# Patient Record
Sex: FEMALE | Race: WHITE | Employment: PART TIME | URBAN - METROPOLITAN AREA
[De-identification: names, ages, dates, MRNs, and addresses within clinical notes are randomized per-mention and may not be internally consistent; named-entity substitution may affect disease eponyms.]

---

## 2017-05-12 ENCOUNTER — OFFICE VISIT (OUTPATIENT)
Dept: GASTROENTEROLOGY | Age: 27
End: 2017-05-12
Payer: MEDICARE

## 2017-05-12 VITALS
BODY MASS INDEX: 28.1 KG/M2 | HEIGHT: 63 IN | HEART RATE: 110 BPM | OXYGEN SATURATION: 98 % | WEIGHT: 158.6 LBS | DIASTOLIC BLOOD PRESSURE: 87 MMHG | RESPIRATION RATE: 13 BRPM | TEMPERATURE: 97.1 F | SYSTOLIC BLOOD PRESSURE: 132 MMHG

## 2017-05-12 DIAGNOSIS — K80.20 GALL STONES: Primary | ICD-10-CM

## 2017-05-12 DIAGNOSIS — K62.5 RECTAL BLEEDING: ICD-10-CM

## 2017-05-12 DIAGNOSIS — K58.2 IRRITABLE BOWEL SYNDROME WITH BOTH CONSTIPATION AND DIARRHEA: ICD-10-CM

## 2017-05-12 DIAGNOSIS — K58.1 IRRITABLE BOWEL SYNDROME WITH CONSTIPATION: ICD-10-CM

## 2017-05-12 DIAGNOSIS — F41.9 ANXIETY: ICD-10-CM

## 2017-05-12 PROCEDURE — 99214 OFFICE O/P EST MOD 30 MIN: CPT | Performed by: INTERNAL MEDICINE

## 2017-05-12 ASSESSMENT — ENCOUNTER SYMPTOMS
ABDOMINAL DISTENTION: 1
VOMITING: 0
BLOOD IN STOOL: 0
COUGH: 0
DIARRHEA: 0
SINUS PRESSURE: 1
ANAL BLEEDING: 0
RECTAL PAIN: 0
NAUSEA: 0
CONSTIPATION: 0
WHEEZING: 1
ABDOMINAL PAIN: 0
EYES NEGATIVE: 1

## 2018-01-17 ENCOUNTER — OFFICE VISIT (OUTPATIENT)
Dept: GASTROENTEROLOGY | Age: 28
End: 2018-01-17
Payer: COMMERCIAL

## 2018-01-17 VITALS
HEIGHT: 63 IN | HEART RATE: 82 BPM | RESPIRATION RATE: 12 BRPM | WEIGHT: 148.6 LBS | DIASTOLIC BLOOD PRESSURE: 73 MMHG | SYSTOLIC BLOOD PRESSURE: 111 MMHG | BODY MASS INDEX: 26.33 KG/M2

## 2018-01-17 DIAGNOSIS — K58.2 IRRITABLE BOWEL SYNDROME WITH BOTH CONSTIPATION AND DIARRHEA: Primary | ICD-10-CM

## 2018-01-17 DIAGNOSIS — K62.5 RECTAL BLEEDING: ICD-10-CM

## 2018-01-17 DIAGNOSIS — R63.4 WEIGHT LOSS: ICD-10-CM

## 2018-01-17 DIAGNOSIS — F43.9 STRESS: ICD-10-CM

## 2018-01-17 PROCEDURE — 99214 OFFICE O/P EST MOD 30 MIN: CPT | Performed by: INTERNAL MEDICINE

## 2018-01-17 ASSESSMENT — ENCOUNTER SYMPTOMS
COUGH: 0
VOMITING: 0
NAUSEA: 0
ABDOMINAL DISTENTION: 0
BLOOD IN STOOL: 0
ABDOMINAL PAIN: 0
RECTAL PAIN: 0
SINUS PRESSURE: 1
EYES NEGATIVE: 1
DIARRHEA: 1
CONSTIPATION: 1
ANAL BLEEDING: 1
RESPIRATORY NEGATIVE: 1
WHEEZING: 0

## 2018-01-17 NOTE — PROGRESS NOTES
Subjective:      Patient ID: Lizz Parham is a 32 y.o. female. HPI   Dr. Alex Larry MD our mutual patient Lizz Parham was seen  for   1. Irritable bowel syndrome with both constipation and diarrhea    2. Rectal bleeding    3. Weight loss    4. Stress     . Here for f/u   Has been eating rel healthy and is feeling better  Has lost about 10 lbs  No bleeding  No melena  Has some stress issues    Past Medical, Family, and Social History reviewed and does contribute to the patient presenting condition. patient\"s PMH/PSH,SH,PSYCH hx, MEDs, ALLERGIES, and ROS was all reviewed and updated ion the appropriate sections      Review of Systems   Constitutional: Positive for fatigue. HENT: Positive for postnasal drip and sinus pressure. Eyes: Negative. Respiratory: Negative. Negative for cough and wheezing. Cardiovascular: Negative. Gastrointestinal: Positive for anal bleeding, constipation and diarrhea. Negative for abdominal distention, abdominal pain, blood in stool, nausea, rectal pain and vomiting. Endocrine: Negative. Genitourinary: Negative. Musculoskeletal: Negative. Skin: Negative. Allergic/Immunologic: Positive for environmental allergies. Neurological: Positive for weakness. Negative for dizziness and light-headedness. Hematological: Negative. Psychiatric/Behavioral: Positive for sleep disturbance. The patient is nervous/anxious. Reviewed and agree  Objective:   Physical Exam   Constitutional: She is oriented to person, place, and time. She appears well-developed and well-nourished. HENT:   Head: Normocephalic and atraumatic. Mouth/Throat: Oropharynx is clear and moist.   Eyes: Conjunctivae are normal. Pupils are equal, round, and reactive to light. Neck: Normal range of motion. Neck supple. Cardiovascular: Normal heart sounds and intact distal pulses. Pulmonary/Chest: Effort normal and breath sounds normal.   Abdominal: Soft.  Bowel sounds are

## 2018-05-01 ENCOUNTER — OFFICE VISIT (OUTPATIENT)
Dept: FAMILY MEDICINE CLINIC | Age: 28
End: 2018-05-01
Payer: COMMERCIAL

## 2018-05-01 VITALS
BODY MASS INDEX: 27.42 KG/M2 | WEIGHT: 149 LBS | SYSTOLIC BLOOD PRESSURE: 120 MMHG | HEART RATE: 106 BPM | RESPIRATION RATE: 20 BRPM | HEIGHT: 62 IN | DIASTOLIC BLOOD PRESSURE: 88 MMHG | OXYGEN SATURATION: 97 % | TEMPERATURE: 99.5 F

## 2018-05-01 DIAGNOSIS — R19.7 DIARRHEA, UNSPECIFIED TYPE: ICD-10-CM

## 2018-05-01 DIAGNOSIS — J02.9 SORE THROAT: ICD-10-CM

## 2018-05-01 DIAGNOSIS — J02.9 ACUTE PHARYNGITIS, UNSPECIFIED ETIOLOGY: Primary | ICD-10-CM

## 2018-05-01 DIAGNOSIS — Z20.818 STREP THROAT EXPOSURE: ICD-10-CM

## 2018-05-01 LAB — S PYO AG THROAT QL: NORMAL

## 2018-05-01 PROCEDURE — 87880 STREP A ASSAY W/OPTIC: CPT | Performed by: NURSE PRACTITIONER

## 2018-05-01 PROCEDURE — 99201 PR OFFICE OUTPATIENT NEW 10 MINUTES: CPT | Performed by: NURSE PRACTITIONER

## 2018-05-01 RX ORDER — AMOXICILLIN 500 MG/1
500 CAPSULE ORAL 3 TIMES DAILY
Qty: 30 CAPSULE | Refills: 0 | Status: SHIPPED | OUTPATIENT
Start: 2018-05-01 | End: 2018-05-11 | Stop reason: ALTCHOICE

## 2018-05-01 RX ORDER — NORETHINDRONE ACETATE AND ETHINYL ESTRADIOL 1MG-20(21)
KIT ORAL
Refills: 10 | COMMUNITY
Start: 2018-04-27 | End: 2018-05-11 | Stop reason: ALTCHOICE

## 2018-05-01 ASSESSMENT — PATIENT HEALTH QUESTIONNAIRE - PHQ9
1. LITTLE INTEREST OR PLEASURE IN DOING THINGS: 0
SUM OF ALL RESPONSES TO PHQ9 QUESTIONS 1 & 2: 0
2. FEELING DOWN, DEPRESSED OR HOPELESS: 0
SUM OF ALL RESPONSES TO PHQ QUESTIONS 1-9: 0

## 2018-05-01 ASSESSMENT — ENCOUNTER SYMPTOMS
RHINORRHEA: 0
SHORTNESS OF BREATH: 0
DIARRHEA: 1
WHEEZING: 0
ABDOMINAL PAIN: 0
VOMITING: 0
SORE THROAT: 1
COUGH: 1

## 2018-05-11 ENCOUNTER — OFFICE VISIT (OUTPATIENT)
Dept: FAMILY MEDICINE CLINIC | Age: 28
End: 2018-05-11
Payer: COMMERCIAL

## 2018-05-11 VITALS
BODY MASS INDEX: 27.38 KG/M2 | OXYGEN SATURATION: 98 % | RESPIRATION RATE: 18 BRPM | DIASTOLIC BLOOD PRESSURE: 68 MMHG | HEIGHT: 62 IN | TEMPERATURE: 99.3 F | WEIGHT: 148.81 LBS | SYSTOLIC BLOOD PRESSURE: 120 MMHG | HEART RATE: 78 BPM

## 2018-05-11 DIAGNOSIS — J20.9 ACUTE BRONCHITIS, UNSPECIFIED ORGANISM: Primary | ICD-10-CM

## 2018-05-11 PROCEDURE — 99213 OFFICE O/P EST LOW 20 MIN: CPT | Performed by: NURSE PRACTITIONER

## 2018-05-11 RX ORDER — ALBUTEROL SULFATE 90 UG/1
2 AEROSOL, METERED RESPIRATORY (INHALATION) EVERY 4 HOURS PRN
Qty: 1 INHALER | Refills: 0 | Status: SHIPPED | OUTPATIENT
Start: 2018-05-11 | End: 2018-06-13

## 2018-05-11 RX ORDER — PREDNISONE 20 MG/1
40 TABLET ORAL DAILY
Qty: 8 TABLET | Refills: 0 | Status: SHIPPED | OUTPATIENT
Start: 2018-05-11 | End: 2018-05-15

## 2018-05-11 RX ORDER — BENZONATATE 100 MG/1
100 CAPSULE ORAL 3 TIMES DAILY PRN
Qty: 20 CAPSULE | Refills: 0 | Status: SHIPPED | OUTPATIENT
Start: 2018-05-11 | End: 2018-06-13

## 2018-05-11 ASSESSMENT — ENCOUNTER SYMPTOMS
VOMITING: 0
COUGH: 1
DIARRHEA: 0

## 2018-06-13 ENCOUNTER — OFFICE VISIT (OUTPATIENT)
Dept: GASTROENTEROLOGY | Age: 28
End: 2018-06-13
Payer: COMMERCIAL

## 2018-06-13 VITALS
WEIGHT: 149.4 LBS | BODY MASS INDEX: 27.32 KG/M2 | SYSTOLIC BLOOD PRESSURE: 116 MMHG | DIASTOLIC BLOOD PRESSURE: 74 MMHG | HEART RATE: 105 BPM

## 2018-06-13 DIAGNOSIS — K58.2 IRRITABLE BOWEL SYNDROME WITH BOTH CONSTIPATION AND DIARRHEA: ICD-10-CM

## 2018-06-13 DIAGNOSIS — R14.0 ABDOMINAL BLOATING: ICD-10-CM

## 2018-06-13 DIAGNOSIS — R10.84 GENERALIZED ABDOMINAL PAIN: ICD-10-CM

## 2018-06-13 DIAGNOSIS — K62.89 RECTAL PAIN: ICD-10-CM

## 2018-06-13 DIAGNOSIS — K62.5 RECTAL BLEEDING: Primary | ICD-10-CM

## 2018-06-13 DIAGNOSIS — R11.0 NAUSEA: ICD-10-CM

## 2018-06-13 PROBLEM — R10.9 ABDOMINAL PAIN: Status: ACTIVE | Noted: 2018-06-13

## 2018-06-13 PROCEDURE — 99214 OFFICE O/P EST MOD 30 MIN: CPT | Performed by: INTERNAL MEDICINE

## 2018-06-13 RX ORDER — POLYETHYLENE GLYCOL 3350 17 G/17G
POWDER, FOR SOLUTION ORAL
Qty: 255 G | Refills: 0 | Status: SHIPPED | OUTPATIENT
Start: 2018-06-13 | End: 2018-07-13

## 2018-06-13 RX ORDER — ACETAMINOPHEN 500 MG
500 TABLET ORAL EVERY 4 HOURS PRN
COMMUNITY
End: 2021-06-17

## 2018-06-13 RX ORDER — FAMOTIDINE 40 MG/1
40 TABLET, FILM COATED ORAL NIGHTLY
Refills: 0 | COMMUNITY
Start: 2018-05-23 | End: 2019-06-27

## 2018-06-13 ASSESSMENT — ENCOUNTER SYMPTOMS
CONSTIPATION: 1
DIARRHEA: 1
NAUSEA: 0
COUGH: 0
ABDOMINAL PAIN: 1
RESPIRATORY NEGATIVE: 1
EYES NEGATIVE: 1
ANAL BLEEDING: 1
VOMITING: 1
BLOOD IN STOOL: 0
WHEEZING: 0
RECTAL PAIN: 1
ABDOMINAL DISTENTION: 1
SINUS PRESSURE: 1

## 2018-07-08 ENCOUNTER — HOSPITAL ENCOUNTER (EMERGENCY)
Age: 28
Discharge: HOME | End: 2018-07-08
Payer: COMMERCIAL

## 2018-07-08 ENCOUNTER — APPOINTMENT (OUTPATIENT)
Dept: GENERAL RADIOLOGY | Age: 28
End: 2018-07-08
Payer: COMMERCIAL

## 2018-07-08 VITALS
RESPIRATION RATE: 16 BRPM | TEMPERATURE: 98.1 F | HEIGHT: 62 IN | HEART RATE: 89 BPM | SYSTOLIC BLOOD PRESSURE: 138 MMHG | WEIGHT: 140 LBS | DIASTOLIC BLOOD PRESSURE: 90 MMHG | OXYGEN SATURATION: 99 % | BODY MASS INDEX: 25.76 KG/M2

## 2018-07-08 DIAGNOSIS — M84.374A STRESS FRACTURE OF RIGHT FOOT, INITIAL ENCOUNTER: Primary | ICD-10-CM

## 2018-07-08 PROCEDURE — 99283 EMERGENCY DEPT VISIT LOW MDM: CPT

## 2018-07-08 PROCEDURE — 73630 X-RAY EXAM OF FOOT: CPT

## 2018-07-08 RX ORDER — IBUPROFEN 600 MG/1
600 TABLET ORAL EVERY 6 HOURS PRN
Qty: 30 TABLET | Refills: 0 | Status: SHIPPED | OUTPATIENT
Start: 2018-07-08 | End: 2020-08-25

## 2018-07-08 ASSESSMENT — PAIN DESCRIPTION - DESCRIPTORS: DESCRIPTORS: ACHING;SHARP;STABBING

## 2018-07-08 ASSESSMENT — PAIN DESCRIPTION - PAIN TYPE: TYPE: ACUTE PAIN

## 2018-07-08 ASSESSMENT — PAIN SCALES - GENERAL: PAINLEVEL_OUTOF10: 6

## 2018-07-08 ASSESSMENT — PAIN DESCRIPTION - ORIENTATION: ORIENTATION: RIGHT

## 2018-07-08 ASSESSMENT — PAIN DESCRIPTION - LOCATION: LOCATION: FOOT

## 2018-07-08 NOTE — ED PROVIDER NOTES
[address] [place] [address] ED  eMERGENCY dEPARTMENT eNCOUnter      Pt Name: Elena Perez  MRN: [id number]  [place] 1990  Date of evaluation: 7/8/2018  Provider: Adrienne Desir MD    [address] [place] [address]       Chief Complaint   Patient presents with    Foot Pain     rt foot pain past 10 days(denies injury)         HISTORY OF PRESENT ILLNESS  (Location/Symptom, Timing/Onset, Context/Setting, Quality, Duration, Modifying Factors, Severity.)   Elena Perez is a 32 y.o. female who presents to the emergency department Presents with foot pain a 6 on a scale of 1-10. Does a exacerbated by ambulation has been ongoing for the past 10 days. No history of fall or trauma. 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 Nursing Notes were reviewed. ALLERGIES     Benzodiazepines    CURRENT MEDICATIONS       Previous Medications    ACETAMINOPHEN (TYLENOL) 500 MG TABLET    Take 500 mg by mouth 2 times daily    CHOLECALCIFEROL (VITAMIN D3) 53724 UNITS CAPS    Take 1,000 Units by mouth 2 times daily    DOCUSATE SODIUM (COLACE) 100 MG CAPSULE    Take 100 mg by mouth 2 times daily. FAMOTIDINE (PEPCID) 40 MG TABLET        FLUDROCORTISONE (FLORINEF) 0.1 MG TABLET    Take 0.1 mg by mouth daily    NORETHIN ACE-ETH ESTRAD-FE (BLISOVI FE 1/20 PO)    Take by mouth daily    POLYETHYLENE GLYCOL (GLYCOLAX) POWDER    Please dispense with 4 dulcolax tabs with this prescription. Use as directed by following your patient instructions given by office. PSYLLIUM (METAMUCIL) 58.6 % PACKET    Take 1 packet by mouth daily.     SERTRALINE (ZOLOFT) 50 MG TABLET           PAST MEDICAL HISTORY         Diagnosis Date    Anemia during pregnancy     Irritable bowel syndrome     Kidney stone 9/29/2014    left side 0.6cm sized stone    Neurocardiogenic syncope        SURGICAL HISTORY           Procedure Laterality Date    COLONOSCOPY      GYNECOLOGIC CRYOSURGERY behavior is normal.   Nursing note and vitals reviewed. DIAGNOSTIC RESULTS     EKG: All EKG's are interpreted by the Emergency Department Physician who either signs or Co-signs this chart in the absence of a cardiologist.        RADIOLOGY:   Non-plain film images such as CT, Ultrasound and MRI are read by the radiologist. [facility] radiographic images are visualized and preliminarily interpreted by the emergency physician with the below findings:      Xr Foot Right (min 3 Views)    Result Date: 7/8/2018  EXAMINATION: 3 XRAY VIEWS OF THE RIGHT FOOT 7/8/2018 11:38 am COMPARISON: None. HISTORY: ORDERING SYSTEM PROVIDED HISTORY: Pain TECHNOLOGIST PROVIDED HISTORY: Reason for exam:->Pain Acuity: Acute Type of Exam: Unknown FINDINGS: Frontal, lateral, and oblique views of the foot are submitted for review. There is no evidence for acute fracture or dislocation. Bony mineralization is normal for age. Overlying soft tissues are unremarkable. No radiopaque foreign body identified. No acute osseus abnormality of the foot. Interpretation per the Radiologist below, if available at the time of this note:    XR FOOT RIGHT (MIN 3 VIEWS)   Final Result   No acute osseus abnormality of the foot. ED BEDSIDE ULTRASOUND:   Performed by ED Physician - none    LABS:  Labs Reviewed - No data to display    All other labs were within normal range or not returned as of this dictation. EMERGENCY DEPARTMENT COURSE and DIFFERENTIAL DIAGNOSIS/MDM:   Vitals:    Vitals:    07/08/18 1103   BP: (!) 138/90   Pulse: 89   Resp: 16   Temp: 98.1 °F (36.7 °C)   TempSrc: Oral   SpO2: 99%   Weight: 140 lb (63.5 kg)   Height: 5' 2\" (1.575 m)     Patient said discharged [employer] shoe crutches and advised follow-up care    CONSULTS:  None    PROCEDURES:  Not clinically indicated. FINAL IMPRESSION      1.  Stress fracture of right foot, initial encounter          DISPOSITION/PLAN   DISPOSITION Decision To Discharge 07/08/2018 12:39:45 PM      PATIENT REFERRED TO:   Anibal Sandoval, [address] [facility] [address] [phone]            DISCHARGE MEDICATIONS:     New Prescriptions    IBUPROFEN (ADVIL;MOTRIN) 600 MG TABLET    Take 1 tablet by mouth every 6 hours as needed for Pain           (Please note that portions of this note were completed with a voice recognition program.  Efforts were made to edit the dictations but occasionally words are mis-transcribed.)    Marialuisa Steen MD  Attending Emergency Physician           Marialuisa Steen MD  07/08/18 3679

## 2018-08-28 ENCOUNTER — ANESTHESIA EVENT (OUTPATIENT)
Dept: OPERATING ROOM | Age: 28
End: 2018-08-28
Payer: COMMERCIAL

## 2018-08-28 ENCOUNTER — TELEPHONE (OUTPATIENT)
Dept: GASTROENTEROLOGY | Age: 28
End: 2018-08-28

## 2018-08-28 NOTE — TELEPHONE ENCOUNTER
Writer called pt to confirm procedure for [email or web address] @4674am now. Pt agrees to new time and understands directions.

## 2018-08-29 ENCOUNTER — HOSPITAL ENCOUNTER (OUTPATIENT)
Age: 28
Setting detail: OUTPATIENT SURGERY
Discharge: HOME | End: 2018-08-29
Attending: INTERNAL MEDICINE | Admitting: INTERNAL MEDICINE
Payer: COMMERCIAL

## 2018-08-29 ENCOUNTER — ANESTHESIA (OUTPATIENT)
Dept: OPERATING ROOM | Age: 28
End: 2018-08-29
Payer: COMMERCIAL

## 2018-08-29 VITALS
TEMPERATURE: 98.1 F | WEIGHT: 130 LBS | OXYGEN SATURATION: 100 % | HEIGHT: 63 IN | BODY MASS INDEX: 23.04 KG/M2 | DIASTOLIC BLOOD PRESSURE: 70 MMHG | SYSTOLIC BLOOD PRESSURE: 118 MMHG | RESPIRATION RATE: 18 BRPM | HEART RATE: 75 BPM

## 2018-08-29 VITALS — OXYGEN SATURATION: 100 % | SYSTOLIC BLOOD PRESSURE: 106 MMHG | DIASTOLIC BLOOD PRESSURE: 64 MMHG

## 2018-08-29 LAB — HCG, PREGNANCY URINE (POC): NEGATIVE

## 2018-08-29 PROCEDURE — 88305 TISSUE EXAM BY PATHOLOGIST: CPT

## 2018-08-29 PROCEDURE — 3700000000 HC ANESTHESIA ATTENDED CARE: Performed by: INTERNAL MEDICINE

## 2018-08-29 PROCEDURE — 3609012400 HC EGD TRANSORAL BIOPSY SINGLE/MULTIPLE: Performed by: INTERNAL MEDICINE

## 2018-08-29 PROCEDURE — 2709999900 HC NON-CHARGEABLE SUPPLY: Performed by: INTERNAL MEDICINE

## 2018-08-29 PROCEDURE — 2580000003 HC RX 258: Performed by: ANESTHESIOLOGY

## 2018-08-29 PROCEDURE — 3609027000 HC COLONOSCOPY: Performed by: INTERNAL MEDICINE

## 2018-08-29 PROCEDURE — 7100000010 HC PHASE II RECOVERY - FIRST 15 MIN: Performed by: INTERNAL MEDICINE

## 2018-08-29 PROCEDURE — 2500000003 HC RX 250 WO HCPCS: Performed by: ANESTHESIOLOGY

## 2018-08-29 PROCEDURE — 84703 CHORIONIC GONADOTROPIN ASSAY: CPT

## 2018-08-29 PROCEDURE — 3700000001 HC ADD 15 MINUTES (ANESTHESIA): Performed by: INTERNAL MEDICINE

## 2018-08-29 PROCEDURE — 6360000002 HC RX W HCPCS: Performed by: NURSE ANESTHETIST, CERTIFIED REGISTERED

## 2018-08-29 PROCEDURE — 7100000011 HC PHASE II RECOVERY - ADDTL 15 MIN: Performed by: INTERNAL MEDICINE

## 2018-08-29 RX ORDER — SODIUM CHLORIDE 0.9 % (FLUSH) 0.9 %
10 SYRINGE (ML) INJECTION PRN
Status: DISCONTINUED | OUTPATIENT
Start: 2018-08-29 | End: 2018-08-29 | Stop reason: HOSPADM

## 2018-08-29 RX ORDER — SODIUM CHLORIDE 0.9 % (FLUSH) 0.9 %
10 SYRINGE (ML) INJECTION EVERY 12 HOURS SCHEDULED
Status: DISCONTINUED | OUTPATIENT
Start: 2018-08-29 | End: 2018-08-29 | Stop reason: HOSPADM

## 2018-08-29 RX ORDER — PROPOFOL 10 MG/ML
INJECTION, EMULSION INTRAVENOUS PRN
Status: DISCONTINUED | OUTPATIENT
Start: 2018-08-29 | End: 2018-08-29 | Stop reason: SDUPTHER

## 2018-08-29 RX ORDER — SODIUM CHLORIDE 9 MG/ML
INJECTION, SOLUTION INTRAVENOUS CONTINUOUS
Status: DISCONTINUED | OUTPATIENT
Start: 2018-08-30 | End: 2018-08-29

## 2018-08-29 RX ORDER — LIDOCAINE HYDROCHLORIDE 10 MG/ML
1 INJECTION, SOLUTION EPIDURAL; INFILTRATION; INTRACAUDAL; PERINEURAL
Status: COMPLETED | OUTPATIENT
Start: 2018-08-29 | End: 2018-08-29

## 2018-08-29 RX ORDER — ONDANSETRON 2 MG/ML
4 INJECTION INTRAMUSCULAR; INTRAVENOUS
Status: DISCONTINUED | OUTPATIENT
Start: 2018-08-29 | End: 2018-08-29 | Stop reason: HOSPADM

## 2018-08-29 RX ORDER — SODIUM CHLORIDE, SODIUM LACTATE, POTASSIUM CHLORIDE, CALCIUM CHLORIDE 600; 310; 30; 20 MG/100ML; MG/100ML; MG/100ML; MG/100ML
INJECTION, SOLUTION INTRAVENOUS CONTINUOUS
Status: DISCONTINUED | OUTPATIENT
Start: 2018-08-30 | End: 2018-08-29 | Stop reason: HOSPADM

## 2018-08-29 RX ADMIN — PROPOFOL 20 MG: 10 INJECTION, EMULSION INTRAVENOUS at 11:50

## 2018-08-29 RX ADMIN — SODIUM CHLORIDE, POTASSIUM CHLORIDE, SODIUM LACTATE AND CALCIUM CHLORIDE: 600; 310; 30; 20 INJECTION, SOLUTION INTRAVENOUS at 10:39

## 2018-08-29 RX ADMIN — PROPOFOL 200 MG: 10 INJECTION, EMULSION INTRAVENOUS at 11:42

## 2018-08-29 RX ADMIN — LIDOCAINE HYDROCHLORIDE 50 MG: 10 INJECTION, SOLUTION EPIDURAL; INFILTRATION; INTRACAUDAL; PERINEURAL at 11:42

## 2018-08-29 RX ADMIN — PROPOFOL 20 MG: 10 INJECTION, EMULSION INTRAVENOUS at 11:57

## 2018-08-29 RX ADMIN — PROPOFOL 20 MG: 10 INJECTION, EMULSION INTRAVENOUS at 11:48

## 2018-08-29 RX ADMIN — PROPOFOL 20 MG: 10 INJECTION, EMULSION INTRAVENOUS at 11:59

## 2018-08-29 RX ADMIN — PROPOFOL 20 MG: 10 INJECTION, EMULSION INTRAVENOUS at 11:45

## 2018-08-29 RX ADMIN — PROPOFOL 20 MG: 10 INJECTION, EMULSION INTRAVENOUS at 11:52

## 2018-08-29 RX ADMIN — PROPOFOL 20 MG: 10 INJECTION, EMULSION INTRAVENOUS at 11:55

## 2018-08-29 RX ADMIN — SODIUM CHLORIDE, POTASSIUM CHLORIDE, SODIUM LACTATE AND CALCIUM CHLORIDE: 600; 310; 30; 20 INJECTION, SOLUTION INTRAVENOUS at 11:39

## 2018-08-29 ASSESSMENT — PAIN - FUNCTIONAL ASSESSMENT: PAIN_FUNCTIONAL_ASSESSMENT: 0-10

## 2018-08-29 ASSESSMENT — PULMONARY FUNCTION TESTS
PIF_VALUE: 1

## 2018-08-29 ASSESSMENT — ENCOUNTER SYMPTOMS: SHORTNESS OF BREATH: 0

## 2018-08-29 NOTE — OP NOTE
PROCEDURE NOTE    DATE OF PROCEDURE: 8/29/2018     SURGEON: Max Orta MD    ASSISTANT: None    PREOPERATIVE DIAGNOSIS: GERD  NAUSEA AND VOMITING  IBS    POSTOPERATIVE DIAGNOSIS: As described below    OPERATION: Upper GI endoscopy with Biopsy    ANESTHESIA: Moderate Sedation     ESTIMATED BLOOD LOSS: Less than 50 ml    COMPLICATIONS: None. SPECIMENS:  Was Obtained:     HISTORY: The patient is a 25y.o. year old female with history of above preop diagnosis. I recommended esophagogastroduodenoscopy with possible biopsy and I explained the risk, benefits, expected outcome, and alternatives to the procedure. Risks included but are not limited to bleeding, infection, respiratory distress, hypotension, and perforation of the esophagus, stomach, or duodenum. Patient understands and is in agreement. PROCEDURE: The patient was given IV conscious sedation. The patient's SPO2 remained above 90% throughout the procedure. The gastroscope was inserted orally and advanced under direct vision through the esophagus, through the stomach, through the pylorus, and into the descending duodenum. Findings:    Retropharyngeal area was grossly normal appearing    Esophagus: normal    Stomach:    Fundus: normal    Body: normal    Antrum: normal    Duodenum:     Descending: normal. RANDOM BIOPSIES WERE TAKEN      A DIVERTICULUM WAS NOTED    Bulb: normal    The scope was removed and the patient tolerated the procedure well. Recommendations/Plan:   1. F/U Biopsies  2. F/U In Office in 3-4 weeks  3. Discussed with the family  4.  Post sedation patient was stable with stable vital signs and stable O2 saturations    Electronically signed by Max Orta MD  on 8/29/2018 at 11:46 AM

## 2018-08-29 NOTE — H&P
History and Physical Service   [facility]    HISTORY AND PHYSICAL EXAMINATION            Date of Evaluation: 8/29/2018  Patient name:  Vignesh Ramos  MRN:   [id number]  YOB: 1990  PCP:    Terence Dykes MD    History Obtained From:     Patient    History of Present Illness: This is Vignesh Ramos a 25 y.o. female who presents today for a colonoscopy and EGD by Dr. Khloe Lynch. She has a history of rectal bleeding, IBS with both constipation and diarrhea, rectal pain, generalized abdominal pain with bloating and intermittent nausea with GERD like symptoms. The rectal bleeding is intermittent small amounts of bright red blood after a stool. She has been diagnosed in the past with internal hemorrhoids with a sigmoidoscopy. Her last colonoscopy was in 2009 with removal \"of something but not sure if it was a polyp\". No weight loss. No known family history of colon cancer. She does suffer from neurocardiogenic syncope. She denies any recent syncopal episodes. Past Medical History:     Past Medical History:   Diagnosis Date    Anemia during pregnancy     Irritable bowel syndrome     Kidney stone 9/29/2014    left side 0.6cm sized stone    Neurocardiogenic syncope         Past Surgical History:     Past Surgical History:   Procedure Laterality Date    COLONOSCOPY      GYNECOLOGIC CRYOSURGERY      SIGMOIDOSCOPY  10/27/2014    int. hemorrhoids: distal protitis?; possible tubular adenoma    UPPER GASTROINTESTINAL ENDOSCOPY  01/16/2012    wnl        Medications Prior to Admission:     Prior to Admission medications    Medication Sig Start Date End Date Taking?  Authorizing Provider   HYDROXYZINE HCL PO Take 1 tablet by mouth 3 times daily as needed   Yes Historical Provider, MD   famotidine (PEPCID) 40 MG tablet Take 40 mg by mouth nightly  5/23/18  Yes Historical Provider, MD   acetaminophen (TYLENOL) 500 MG tablet Take 500 mg by mouth 2 times daily   Yes Historical Provider, MD   Cholecalciferol (VITAMIN D3) 25449 units CAPS Take 1,000 Units by mouth 2 times daily   Yes Historical Provider, MD   sertraline (ZOLOFT) 50 MG tablet Take 50 mg by mouth nightly  4/11/18  Yes Historical Provider, MD   Norethin Ace-Eth Estrad-FE (BLISOVI FE 1/20 PO) Take by mouth daily   Yes Historical Provider, MD   fludrocortisone (FLORINEF) 0.1 MG tablet Take 0.1 mg by mouth daily   Yes Historical Provider, MD   docusate sodium (COLACE) 100 MG capsule Take 100 mg by mouth 2 times daily. Yes Historical Provider, MD   psyllium (METAMUCIL) 58.6 % packet Take 1 packet by mouth daily. Yes Historical Provider, MD   ibuprofen (ADVIL;MOTRIN) 600 MG tablet Take 1 tablet by mouth every 6 hours as needed for Pain 7/8/18   Jose Eduardo Briseno MD        Allergies:     Benzodiazepines    Social History:     Tobacco:    reports that she has never smoked. She has never used smokeless tobacco.  Alcohol:      reports that she drinks alcohol. Drug Use:  reports that she does not use drugs. Family History:     Family History   Problem Relation Age of Onset    Diabetes Father     High Blood Pressure Father        Review of Systems:     Positive and Negative as described in HPI. CONSTITUTIONAL:  negative for fevers, chills, sweats, fatigue, weight loss  HEENT:  negative for vision, hearing changes, runny nose, throat pain  RESPIRATORY:  negative for shortness of breath, cough, congestion, wheezing. CARDIOVASCULAR:  negative for chest pain, palpitations. GASTROINTESTINAL:  See HPI.   GENITOURINARY:  negative for difficulty of urination, burning with urination, frequency   INTEGUMENT:  negative for rash, skin lesions, easy bruising   HEMATOLOGIC/LYMPHATIC:  negative for swelling/edema   ALLERGIC/IMMUNOLOGIC:  negative for urticaria , itching  ENDOCRINE:  negative increase in drinking, increase in urination, hot or cold intolerance  MUSCULOSKELETAL:  negative joint pains, muscle aches, swelling of

## 2018-08-29 NOTE — ANESTHESIA PRE PROCEDURE
Department of Anesthesiology  Preprocedure Note       Name:  Nell Honeycutt   Age:  25 y.o.  :  1990                                          MRN:  [id number]         Date:  2018      Surgeon: Billy Hassan):  Aung Birmingham MD    Procedure: Procedure(s):  COLONOSCOPY    Medications prior to admission:   Prior to Admission medications    Medication Sig Start Date End Date Taking? Authorizing Provider   HYDROXYZINE HCL PO Take 1 tablet by mouth 3 times daily as needed   Yes Historical Provider, MD   famotidine (PEPCID) 40 MG tablet Take 40 mg by mouth nightly  18  Yes Historical Provider, MD   acetaminophen (TYLENOL) 500 MG tablet Take 500 mg by mouth 2 times daily   Yes Historical Provider, MD   Cholecalciferol (VITAMIN D3) 33854 units CAPS Take 1,000 Units by mouth 2 times daily   Yes Historical Provider, MD   sertraline (ZOLOFT) 50 MG tablet Take 50 mg by mouth nightly  18  Yes Historical Provider, MD   Norethin Ace-Eth Estrad-FE (BLISOVI FE  PO) Take by mouth daily   Yes Historical Provider, MD   fludrocortisone (FLORINEF) 0.1 MG tablet Take 0.1 mg by mouth daily   Yes Historical Provider, MD   docusate sodium (COLACE) 100 MG capsule Take 100 mg by mouth 2 times daily. Yes Historical Provider, MD   psyllium (METAMUCIL) 58.6 % packet Take 1 packet by mouth daily.    Yes Historical Provider, MD   ibuprofen (ADVIL;MOTRIN) 600 MG tablet Take 1 tablet by mouth every 6 hours as needed for Pain 18   Jose Eduardo Briseno MD       Current medications:    Current Facility-Administered Medications   Medication Dose Route Frequency Provider Last Rate Last Dose    [START ON 2018] lactated ringers infusion   Intravenous Continuous Simone Brown MD        sodium chloride flush 0.9 % injection 10 mL  10 mL Intravenous 2 times per day Simone Brown MD        sodium chloride flush 0.9 % injection 10 mL  10 mL Intravenous PRN Simone Brown MD        lidocaine PF 1 % injection 1 mL  1 mL

## 2018-08-31 LAB — SURGICAL PATHOLOGY REPORT: NORMAL

## 2018-09-05 ENCOUNTER — TELEPHONE (OUTPATIENT)
Dept: GASTROENTEROLOGY | Age: 28
End: 2018-09-05

## 2018-10-02 DIAGNOSIS — R11.0 NAUSEA: ICD-10-CM

## 2018-10-02 DIAGNOSIS — R10.84 GENERALIZED ABDOMINAL PAIN: ICD-10-CM

## 2018-10-02 DIAGNOSIS — K62.5 RECTAL BLEEDING: ICD-10-CM

## 2018-10-02 DIAGNOSIS — R14.0 ABDOMINAL BLOATING: ICD-10-CM

## 2018-10-02 DIAGNOSIS — K58.2 IRRITABLE BOWEL SYNDROME WITH BOTH CONSTIPATION AND DIARRHEA: ICD-10-CM

## 2018-10-02 DIAGNOSIS — K62.89 RECTAL PAIN: ICD-10-CM

## 2018-10-03 ENCOUNTER — OFFICE VISIT (OUTPATIENT)
Dept: GASTROENTEROLOGY | Age: 28
End: 2018-10-03
Payer: COMMERCIAL

## 2018-10-03 VITALS — DIASTOLIC BLOOD PRESSURE: 69 MMHG | SYSTOLIC BLOOD PRESSURE: 103 MMHG | HEART RATE: 74 BPM

## 2018-10-03 DIAGNOSIS — K64.8 INTERNAL HEMORRHOIDS WITHOUT COMPLICATION: Primary | ICD-10-CM

## 2018-10-03 DIAGNOSIS — F41.9 ANXIETY: ICD-10-CM

## 2018-10-03 DIAGNOSIS — K58.8 OTHER IRRITABLE BOWEL SYNDROME: ICD-10-CM

## 2018-10-03 PROCEDURE — 99214 OFFICE O/P EST MOD 30 MIN: CPT | Performed by: INTERNAL MEDICINE

## 2018-10-03 ASSESSMENT — ENCOUNTER SYMPTOMS
RESPIRATORY NEGATIVE: 1
CONSTIPATION: 0
TROUBLE SWALLOWING: 0
VOMITING: 0
GASTROINTESTINAL NEGATIVE: 1
RECTAL PAIN: 0
DIARRHEA: 0
NAUSEA: 0
ABDOMINAL PAIN: 0
WHEEZING: 0
BLOOD IN STOOL: 0
BACK PAIN: 1
COUGH: 0
EYES NEGATIVE: 1
CHOKING: 0
ABDOMINAL DISTENTION: 0
SINUS PRESSURE: 1
ANAL BLEEDING: 0

## 2018-10-03 NOTE — PROGRESS NOTES
and agree  Objective:   Physical Exam   Constitutional: She is oriented to person, place, and time. She appears well-developed and well-nourished. HENT:   Head: Normocephalic and atraumatic. Mouth/Throat: Oropharynx is clear and moist.   Eyes: Pupils are equal, round, and reactive to light. Conjunctivae are normal.   Neck: Normal range of motion. Neck supple. Cardiovascular: Normal heart sounds and intact distal pulses. Pulmonary/Chest: Effort normal and breath sounds normal.   Abdominal: Soft. Bowel sounds are normal.   NON TENDER, NON DISTENTED  LIVER SPLEEN AND HERNIAS ARE NOT  PALPABLE  BOWEL SOUNDS ARE POSITIVE      Musculoskeletal: Normal range of motion. Neurological: She is alert and oriented to person, place, and time. Skin: Skin is warm. Psychiatric: Her behavior is normal.       Assessment:      As above      Plan:      Cont Fiber    The patient was instructed to start taking some OTC Probiotics products   These are available over the counter at the Pharmacy stores and Grocery stores  He was explained about the beneficial effects they have in the GI track  They will help to establish the good bacterial mukul and will help with the digestion and bowel movements  The patient has verbalized understanding and agreement to this plan    Pt was advised in detail about some life style and dietary modifications. She was advised about avoidance of caffeine, nicotine and chocolate. Pt was also told to stay away from any kind of fast foods, soda pops. She was also advised to avoid lots of spices, grease and fried food etc.     Instructions were also given about trying to arrange the timing, quality and quantity of food. Instructions were given about using ample amount of fiber including dietary and supplemental fiber either metamucil, bennafiber or citrucell etc.  Pt was advised about drinking ample amount of water without any colors or chemicals. Stress was given about regular exercise.     Pt has

## 2019-06-27 ENCOUNTER — OFFICE VISIT (OUTPATIENT)
Dept: FAMILY MEDICINE CLINIC | Age: 29
End: 2019-06-27
Payer: MEDICARE

## 2019-06-27 VITALS
TEMPERATURE: 98.4 F | DIASTOLIC BLOOD PRESSURE: 70 MMHG | SYSTOLIC BLOOD PRESSURE: 110 MMHG | OXYGEN SATURATION: 100 % | HEART RATE: 79 BPM

## 2019-06-27 DIAGNOSIS — H65.93 OTITIS MEDIA WITH EFFUSION, BILATERAL: Primary | ICD-10-CM

## 2019-06-27 PROCEDURE — 1036F TOBACCO NON-USER: CPT | Performed by: NURSE PRACTITIONER

## 2019-06-27 PROCEDURE — G8419 CALC BMI OUT NRM PARAM NOF/U: HCPCS | Performed by: NURSE PRACTITIONER

## 2019-06-27 PROCEDURE — 99214 OFFICE O/P EST MOD 30 MIN: CPT | Performed by: NURSE PRACTITIONER

## 2019-06-27 PROCEDURE — G8427 DOCREV CUR MEDS BY ELIG CLIN: HCPCS | Performed by: NURSE PRACTITIONER

## 2019-06-27 RX ORDER — FLUTICASONE PROPIONATE 50 MCG
2 SPRAY, SUSPENSION (ML) NASAL DAILY
Qty: 1 BOTTLE | Refills: 0 | Status: SHIPPED | OUTPATIENT
Start: 2019-06-27 | End: 2020-08-25

## 2019-06-27 ASSESSMENT — ENCOUNTER SYMPTOMS
SINUS PRESSURE: 0
EYE REDNESS: 0
VOICE CHANGE: 0
CHEST TIGHTNESS: 0
EYE DISCHARGE: 0
WHEEZING: 0
COUGH: 0
SORE THROAT: 0
SHORTNESS OF BREATH: 0

## 2019-06-27 NOTE — PATIENT INSTRUCTIONS
Patient Education        Middle Ear Fluid: Care Instructions  Your Care Instructions    Fluid often builds up inside the ear during a cold or allergies. Usually the fluid drains away, but sometimes a small tube in the ear, called the eustachian tube, stays blocked for months. Symptoms of fluid buildup may include:  · Popping, ringing, or a feeling of fullness or pressure in the ear. · Trouble hearing. · Balance problems and dizziness. In most cases, you can treat yourself at home. Follow-up care is a key part of your treatment and safety. Be sure to make and go to all appointments, and call your doctor if you are having problems. It's also a good idea to know your test results and keep a list of the medicines you take. How can you care for yourself at home? · In most cases, the fluid clears up within a few months without treatment. You may need more tests if the fluid does not clear up after 3 months. · If your doctor prescribed antibiotics, take them as directed. Do not stop taking them just because you feel better. You need to take the full course of antibiotics. When should you call for help? Call your doctor now or seek immediate medical care if:    · You have symptoms of infection, such as:  ? Increased pain, swelling, warmth, or redness. ? Pus draining from the area. ? A fever.    Watch closely for changes in your health, and be sure to contact your doctor if:    · You notice changes in hearing.     · You do not get better as expected. Where can you learn more? Go to [email or web address] and sign in to your [email or web address] account. Enter G040 in the [facility] box to learn more about \"Middle Ear Fluid: Care Instructions. \"     If you do not have an account, please click on the \"Sign Up Now\" link. Current as of: October 21, 2018  Content Version: 12.0  © 3509-2198 [employer]. Care instructions adapted under license by [facility] ([facility]).  If you have questions

## 2019-06-27 NOTE — PROGRESS NOTES
[address] [facility] [address]  Dept: [phone]  Dept Fax: [phone]     Tian Crowder is a 25 y.o. female who presents to the urgent care today for her medicalconditions/complaints as noted below. Tina Crowder is c/o of Tinnitus (pain and feels swollen - sometime last week - had been wearing earbuds )    HPI:      Otalgia    There is pain in both ears. This is a new problem. Episode onset: a couple days ago. The problem has been unchanged. There has been no fever. Associated symptoms include hearing loss (muffled). Pertinent negatives include no coughing, ear discharge, headaches, rash or sore throat. Treatments tried: ear gtts, advil with antihistamine. The treatment provided mild relief. Past Medical History:   Diagnosis Date    Anemia during pregnancy     Irritable bowel syndrome     Kidney stone 9/29/2014    left side 0.6cm sized stone    Neurocardiogenic syncope       Current Outpatient Medications   Medication Sig Dispense Refill    fluticasone (FLONASE) 50 MCG/ACT nasal spray 2 sprays by Nasal route daily 1 Bottle 0    ibuprofen (ADVIL;MOTRIN) 600 MG tablet Take 1 tablet by mouth every 6 hours as needed for Pain 30 tablet 0    acetaminophen (TYLENOL) 500 MG tablet Take 500 mg by mouth every 4 hours as needed       Norethin Ace-Eth Estrad-FE (BLISOVI FE 1/20 PO) Take by mouth daily       No current facility-administered medications for this visit. Allergies   Allergen Reactions    Benzyl Alcohol Swelling     Acne medication. Reviewed PMH, SH, and FH with the patient and updated. Subjective:      Review of Systems   Constitutional: Negative for chills, fatigue and fever. HENT: Positive for ear pain (bilateral), hearing loss (muffled) and tinnitus. Negative for congestion, ear discharge, postnasal drip, sinus pressure, sneezing, sore throat and voice change.     Eyes: Negative for discharge and redness. Respiratory: Negative for cough, chest tightness, shortness of breath and wheezing. Cardiovascular: Negative. Negative for chest pain. Musculoskeletal: Negative for myalgias. Skin: Negative for rash. Neurological: Negative for dizziness, weakness, light-headedness and headaches. Hematological: Negative for adenopathy. All other systems reviewed and are negative. Objective:      Physical Exam   Constitutional: She appears well-developed and well-nourished. No distress. HENT:   Head: Normocephalic. Right Ear: External ear normal. A middle ear effusion is present. Left Ear: External ear normal. A middle ear effusion is present. Nose: Nose normal. Right sinus exhibits no maxillary sinus tenderness and no frontal sinus tenderness. Left sinus exhibits no maxillary sinus tenderness and no frontal sinus tenderness. Mouth/Throat: Oropharynx is clear and moist. No oropharyngeal exudate or posterior oropharyngeal erythema. Eyes: Right eye exhibits no discharge. Left eye exhibits no discharge. Cardiovascular: Normal rate, regular rhythm and normal heart sounds. No murmur heard. Pulmonary/Chest: Effort normal and breath sounds normal. No respiratory distress. She has no wheezes. She has no rales. Lymphadenopathy:     She has no cervical adenopathy. Skin: Skin is warm. No rash noted. She is not diaphoretic. Nursing note and vitals reviewed. /70 (Site: Right Upper Arm, Position: Sitting, Cuff Size: Medium Adult)   Pulse 79   Temp 98.4 °F (36.9 °C) (Oral)   LMP 06/22/2019 (Exact Date)   SpO2 100%     Assessment:       Diagnosis Orders   1. Otitis media with effusion, bilateral  fluticasone (FLONASE) 50 MCG/ACT nasal spray     Plan:      Based on the physical exam findings-- I believe the symptoms are related to OME. May continue once daily antihistamine. Flonase daily recommended.   Educational materials provided on AVS.  Follow up if symptoms do not improve/worsen. Orders Placed This Encounter   Medications    fluticasone (FLONASE) 50 MCG/ACT nasal spray     Si sprays by Nasal route daily     Dispense:  1 Bottle     Refill:  0        Patient given educational materials - see patient instructions. Discussed use, benefit, and side effects of prescribed medications. All patientquestions answered. Pt voiced understanding.     Electronically signed by NETTA Moore CNP on 2019at 10:12 AM

## 2019-08-13 ENCOUNTER — HOSPITAL ENCOUNTER (OUTPATIENT)
Age: 29
Setting detail: SPECIMEN
Discharge: HOME | End: 2019-08-13
Payer: MEDICARE

## 2019-08-13 ENCOUNTER — OFFICE VISIT (OUTPATIENT)
Dept: FAMILY MEDICINE CLINIC | Age: 29
End: 2019-08-13
Payer: MEDICARE

## 2019-08-13 VITALS
DIASTOLIC BLOOD PRESSURE: 66 MMHG | RESPIRATION RATE: 18 BRPM | SYSTOLIC BLOOD PRESSURE: 102 MMHG | TEMPERATURE: 99 F | OXYGEN SATURATION: 99 % | HEIGHT: 63 IN | BODY MASS INDEX: 21.79 KG/M2 | WEIGHT: 123 LBS | HEART RATE: 83 BPM

## 2019-08-13 DIAGNOSIS — N89.8 VAGINAL DISCHARGE: ICD-10-CM

## 2019-08-13 DIAGNOSIS — N76.0 ACUTE VAGINITIS: Primary | ICD-10-CM

## 2019-08-13 DIAGNOSIS — N76.0 ACUTE VAGINITIS: ICD-10-CM

## 2019-08-13 LAB
BILIRUBIN, POC: NORMAL
BLOOD URINE, POC: NORMAL
CLARITY, POC: CLEAR
COLOR, POC: YELLOW
CONTROL: PRESENT
DIRECT EXAM: ABNORMAL
GLUCOSE URINE, POC: NORMAL
KETONES, POC: NORMAL
LEUKOCYTE EST, POC: NORMAL
Lab: ABNORMAL
NITRITE, POC: NORMAL
PH, POC: 6
PREGNANCY TEST URINE, POC: NEGATIVE
PROTEIN, POC: NORMAL
SPECIFIC GRAVITY, POC: 1.02
SPECIMEN DESCRIPTION: ABNORMAL
UROBILINOGEN, POC: 0.2

## 2019-08-13 PROCEDURE — 99213 OFFICE O/P EST LOW 20 MIN: CPT | Performed by: NURSE PRACTITIONER

## 2019-08-13 PROCEDURE — G8427 DOCREV CUR MEDS BY ELIG CLIN: HCPCS | Performed by: NURSE PRACTITIONER

## 2019-08-13 PROCEDURE — 1036F TOBACCO NON-USER: CPT | Performed by: NURSE PRACTITIONER

## 2019-08-13 PROCEDURE — G8420 CALC BMI NORM PARAMETERS: HCPCS | Performed by: NURSE PRACTITIONER

## 2019-08-13 PROCEDURE — 81025 URINE PREGNANCY TEST: CPT | Performed by: NURSE PRACTITIONER

## 2019-08-13 PROCEDURE — 81003 URINALYSIS AUTO W/O SCOPE: CPT | Performed by: NURSE PRACTITIONER

## 2019-08-13 RX ORDER — METRONIDAZOLE 500 MG/1
500 TABLET ORAL 2 TIMES DAILY
Qty: 14 TABLET | Refills: 0 | Status: SHIPPED | OUTPATIENT
Start: 2019-08-13 | End: 2019-08-20

## 2019-08-13 ASSESSMENT — ENCOUNTER SYMPTOMS
DIARRHEA: 0
NAUSEA: 0
VOMITING: 0
ABDOMINAL PAIN: 0

## 2019-08-13 NOTE — PATIENT INSTRUCTIONS
contact. Although symptoms can be annoying and uncomfortable, bacterial vaginosis does not usually cause other health problems. However, if you have it while you are pregnant, it can cause complications. While the infection may go away on its own, most doctors use antibiotics to treat it. You may have been prescribed pills or vaginal cream. With treatment, bacterial vaginosis usually clears up in 5 to 7 days. Follow-up care is a key part of your treatment and safety. Be sure to make and go to all appointments, and call your doctor if you are having problems. It's also a good idea to know your test results and keep a list of the medicines you take. How can you care for yourself at home? · Take your antibiotics as directed. Do not stop taking them just because you feel better. You need to take the full course of antibiotics. · Do not eat or drink anything that contains alcohol if you are taking metronidazole (Flagyl). · Keep using your medicine if you start your period. Use pads instead of tampons while using a vaginal cream or suppository. Tampons can absorb the medicine. · Wear loose cotton clothing. Do not wear nylon and other materials that hold body heat and moisture close to the skin. · Do not scratch. Relieve itching with a cold pack or a cool bath. · Do not wash your vaginal area more than once a day. Use plain water or a mild, unscented soap. Do not douche. When should you call for help? Watch closely for changes in your health, and be sure to contact your doctor if:    · You have unexpected vaginal bleeding.     · You have a fever.     · You have new or increased pain in your vagina or pelvis.     · You are not getting better after 1 week.     · Your symptoms return after you finish the course of your medicine. Where can you learn more? Go to [email or web address]. org and sign in to your [employer] account.  Enter V405 in the [employer] box to learn more about

## 2019-08-14 LAB
C. TRACHOMATIS DNA ,URINE: NEGATIVE
N. GONORRHOEAE DNA, URINE: NEGATIVE
SPECIMEN DESCRIPTION: NORMAL

## 2020-07-06 ENCOUNTER — HOSPITAL ENCOUNTER (EMERGENCY)
Age: 30
Discharge: HOME | End: 2020-07-06
Attending: EMERGENCY MEDICINE
Payer: MEDICARE

## 2020-07-06 VITALS
HEART RATE: 80 BPM | HEIGHT: 62 IN | DIASTOLIC BLOOD PRESSURE: 71 MMHG | OXYGEN SATURATION: 100 % | BODY MASS INDEX: 24.29 KG/M2 | RESPIRATION RATE: 14 BRPM | SYSTOLIC BLOOD PRESSURE: 103 MMHG | TEMPERATURE: 98.1 F | WEIGHT: 132 LBS

## 2020-07-06 LAB
-: ABNORMAL
ABSOLUTE EOS #: 0.47 K/UL (ref 0–0.44)
ABSOLUTE IMMATURE GRANULOCYTE: <0.03 K/UL (ref 0–0.3)
ABSOLUTE LYMPH #: 3.31 K/UL (ref 1.1–3.7)
ABSOLUTE MONO #: 0.45 K/UL (ref 0.1–1.2)
ALBUMIN SERPL-MCNC: 4.4 G/DL (ref 3.5–5.2)
ALBUMIN/GLOBULIN RATIO: 1.8 (ref 1–2.5)
ALP BLD-CCNC: 51 U/L (ref 35–104)
ALT SERPL-CCNC: 17 U/L (ref 5–33)
AMORPHOUS: ABNORMAL
ANION GAP SERPL CALCULATED.3IONS-SCNC: 11 MMOL/L (ref 9–17)
AST SERPL-CCNC: 23 U/L
BACTERIA: ABNORMAL
BASOPHILS # BLD: 0 % (ref 0–2)
BASOPHILS ABSOLUTE: 0.03 K/UL (ref 0–0.2)
BILIRUB SERPL-MCNC: 0.16 MG/DL (ref 0.3–1.2)
BILIRUBIN URINE: NEGATIVE
BUN BLDV-MCNC: 13 MG/DL (ref 6–20)
BUN/CREAT BLD: ABNORMAL (ref 9–20)
C TRACH DNA GENITAL QL NAA+PROBE: NEGATIVE
CALCIUM SERPL-MCNC: 8.8 MG/DL (ref 8.6–10.4)
CASTS UA: ABNORMAL /LPF (ref 0–8)
CHLORIDE BLD-SCNC: 106 MMOL/L (ref 98–107)
CO2: 23 MMOL/L (ref 20–31)
COLOR: YELLOW
CREAT SERPL-MCNC: 0.64 MG/DL (ref 0.5–0.9)
CRYSTALS, UA: ABNORMAL /HPF
DIFFERENTIAL TYPE: ABNORMAL
DIRECT EXAM: ABNORMAL
EOSINOPHILS RELATIVE PERCENT: 6 % (ref 1–4)
EPITHELIAL CELLS UA: ABNORMAL /HPF (ref 0–5)
GFR AFRICAN AMERICAN: >60 ML/MIN
GFR NON-AFRICAN AMERICAN: >60 ML/MIN
GFR SERPL CREATININE-BSD FRML MDRD: ABNORMAL ML/MIN/{1.73_M2}
GFR SERPL CREATININE-BSD FRML MDRD: ABNORMAL ML/MIN/{1.73_M2}
GLUCOSE BLD-MCNC: 97 MG/DL (ref 70–99)
GLUCOSE URINE: NEGATIVE
HCG QUALITATIVE: NEGATIVE
HCT VFR BLD CALC: 40.4 % (ref 36.3–47.1)
HEMOGLOBIN: 12.7 G/DL (ref 11.9–15.1)
IMMATURE GRANULOCYTES: 0 %
KETONES, URINE: NEGATIVE
LEUKOCYTE ESTERASE, URINE: NEGATIVE
LIPASE: 39 U/L (ref 13–60)
LYMPHOCYTES # BLD: 40 % (ref 24–43)
Lab: ABNORMAL
MCH RBC QN AUTO: 28.7 PG (ref 25.2–33.5)
MCHC RBC AUTO-ENTMCNC: 31.4 G/DL (ref 28.4–34.8)
MCV RBC AUTO: 91.2 FL (ref 82.6–102.9)
MONOCYTES # BLD: 5 % (ref 3–12)
MUCUS: ABNORMAL
N. GONORRHOEAE DNA: NEGATIVE
NITRITE, URINE: NEGATIVE
NRBC AUTOMATED: 0 PER 100 WBC
OTHER OBSERVATIONS UA: ABNORMAL
PDW BLD-RTO: 12.7 % (ref 11.8–14.4)
PH UA: 5.5 (ref 5–8)
PLATELET # BLD: 260 K/UL (ref 138–453)
PLATELET ESTIMATE: ABNORMAL
PMV BLD AUTO: 9.2 FL (ref 8.1–13.5)
POTASSIUM SERPL-SCNC: 3.6 MMOL/L (ref 3.7–5.3)
PROTEIN UA: ABNORMAL
RBC # BLD: 4.43 M/UL (ref 3.95–5.11)
RBC # BLD: ABNORMAL 10*6/UL
RBC UA: ABNORMAL /HPF (ref 0–4)
RENAL EPITHELIAL, UA: ABNORMAL /HPF
SEG NEUTROPHILS: 49 % (ref 36–65)
SEGMENTED NEUTROPHILS ABSOLUTE COUNT: 4 K/UL (ref 1.5–8.1)
SODIUM BLD-SCNC: 140 MMOL/L (ref 135–144)
SPECIFIC GRAVITY UA: 1.03 (ref 1–1.03)
SPECIMEN DESCRIPTION: ABNORMAL
SPECIMEN DESCRIPTION: NORMAL
TOTAL PROTEIN: 6.9 G/DL (ref 6.4–8.3)
TRICHOMONAS: ABNORMAL
TURBIDITY: ABNORMAL
URINE HGB: NEGATIVE
UROBILINOGEN, URINE: NORMAL
WBC # BLD: 8.3 K/UL (ref 3.5–11.3)
WBC # BLD: ABNORMAL 10*3/UL
WBC UA: ABNORMAL /HPF (ref 0–5)
YEAST: ABNORMAL

## 2020-07-06 PROCEDURE — 99284 EMERGENCY DEPT VISIT MOD MDM: CPT

## 2020-07-06 PROCEDURE — 2580000003 HC RX 258: Performed by: STUDENT IN AN ORGANIZED HEALTH CARE EDUCATION/TRAINING PROGRAM

## 2020-07-06 PROCEDURE — 87660 TRICHOMONAS VAGIN DIR PROBE: CPT

## 2020-07-06 PROCEDURE — 85025 COMPLETE CBC W/AUTO DIFF WBC: CPT

## 2020-07-06 PROCEDURE — 80053 COMPREHEN METABOLIC PANEL: CPT

## 2020-07-06 PROCEDURE — 87591 N.GONORRHOEAE DNA AMP PROB: CPT

## 2020-07-06 PROCEDURE — 93005 ELECTROCARDIOGRAM TRACING: CPT | Performed by: STUDENT IN AN ORGANIZED HEALTH CARE EDUCATION/TRAINING PROGRAM

## 2020-07-06 PROCEDURE — 81001 URINALYSIS AUTO W/SCOPE: CPT

## 2020-07-06 PROCEDURE — 87480 CANDIDA DNA DIR PROBE: CPT

## 2020-07-06 PROCEDURE — 6370000000 HC RX 637 (ALT 250 FOR IP): Performed by: STUDENT IN AN ORGANIZED HEALTH CARE EDUCATION/TRAINING PROGRAM

## 2020-07-06 PROCEDURE — 87510 GARDNER VAG DNA DIR PROBE: CPT

## 2020-07-06 PROCEDURE — 83690 ASSAY OF LIPASE: CPT

## 2020-07-06 PROCEDURE — 87491 CHLMYD TRACH DNA AMP PROBE: CPT

## 2020-07-06 PROCEDURE — 84703 CHORIONIC GONADOTROPIN ASSAY: CPT

## 2020-07-06 RX ORDER — FLUCONAZOLE 50 MG/1
150 TABLET ORAL ONCE
Status: COMPLETED | OUTPATIENT
Start: 2020-07-06 | End: 2020-07-06

## 2020-07-06 RX ORDER — 0.9 % SODIUM CHLORIDE 0.9 %
1000 INTRAVENOUS SOLUTION INTRAVENOUS ONCE
Status: COMPLETED | OUTPATIENT
Start: 2020-07-06 | End: 2020-07-06

## 2020-07-06 RX ADMIN — FLUCONAZOLE 150 MG: 50 TABLET ORAL at 08:44

## 2020-07-06 RX ADMIN — SODIUM CHLORIDE 1000 ML: 9 INJECTION, SOLUTION INTRAVENOUS at 06:24

## 2020-07-06 ASSESSMENT — ENCOUNTER SYMPTOMS
NAUSEA: 1
SHORTNESS OF BREATH: 0
ABDOMINAL PAIN: 1
VOMITING: 0
RHINORRHEA: 0
WHEEZING: 0
PHOTOPHOBIA: 0
BACK PAIN: 0

## 2020-07-06 ASSESSMENT — PAIN SCALES - GENERAL: PAINLEVEL_OUTOF10: 6

## 2020-07-06 ASSESSMENT — PAIN DESCRIPTION - ONSET: ONSET: ON-GOING

## 2020-07-06 ASSESSMENT — PAIN DESCRIPTION - PROGRESSION: CLINICAL_PROGRESSION: NOT CHANGED

## 2020-07-06 ASSESSMENT — PAIN DESCRIPTION - FREQUENCY: FREQUENCY: CONTINUOUS

## 2020-07-06 ASSESSMENT — PAIN DESCRIPTION - PAIN TYPE: TYPE: ACUTE PAIN

## 2020-07-06 ASSESSMENT — PAIN DESCRIPTION - LOCATION: LOCATION: ABDOMEN

## 2020-07-06 ASSESSMENT — PAIN DESCRIPTION - DESCRIPTORS: DESCRIPTORS: ACHING

## 2020-07-06 NOTE — ED PROVIDER NOTES
[address] [facility]  Emergency Department  Emergency Medicine Resident Sign-out     Care of Runell Dandy was assumed from Dr. Kingsley Blank and is being seen for Abdominal Pain  . The patient's initial evaluation and plan have been discussed with the prior provider who initially evaluated the patient. EMERGENCY DEPARTMENT COURSE / MEDICAL DECISION MAKING:       MEDICATIONS GIVEN:  Orders Placed This Encounter   Medications    0.9 % sodium chloride bolus    fluconazole (DIFLUCAN) tablet 150 mg       LABS / RADIOLOGY:     Labs Reviewed   VAGINITIS DNA PROBE - Abnormal; Notable for the following components:       Result Value    Direct Exam POSITIVE for Candida sp. (*)     All other components within normal limits   COMPREHENSIVE METABOLIC PANEL - Abnormal; Notable for the following components:    Potassium 3.6 (*)     Total Bilirubin 0.16 (*)     All other components within normal limits   CBC WITH AUTO DIFFERENTIAL - Abnormal; Notable for the following components:    Eosinophils % 6 (*)     Absolute Eos # 0.47 (*)     All other components within normal limits   URINALYSIS WITH MICROSCOPIC - Abnormal; Notable for the following components:    Turbidity UA CLOUDY (*)     Protein, UA 1+ (*)     Bacteria, UA FEW (*)     All other components within normal limits   C.TRACHOMATIS N.GONORRHOEAE DNA   LIPASE   HCG, SERUM, QUALITATIVE       No results found. RECENT VITALS:     Temp: 98.1 °F (36.7 °C),  Pulse: 80, Resp: 14, BP: 103/71, SpO2: 100 %    This patient is a 30 y.o. Female with bilateral lower abdominal pain. Upper, orthostatics, EKG, are unremarkable and she received IV fluids. Await vaginitis probe. ED Course as of Jul 06 1818   Mon Jul 06, 2020   0609 hCG Qual: NEGATIVE [KD]   0609 Hemoglobin Quant: 12.7 [KD]   0611 Lipase: 39 [KD]   0836 Will treat with Diflucan and discharge patient home to follow-up with PCP in 5 days for reevaluation. DIRECT EXAM.(!): POSITIVE for Candida sp.  [CS] 8273 Patient was agreeable to discharge plan was educated return precautions. Patient questions were answered and had no other further complaints at this time. [CS]      ED Course User Index  [CS] Divya Gutierrez DO  [KD] Maximino Salgado DO         OUTSTANDING TASKS / RECOMMENDATIONS:    1. Await lab work. 2. Possible Discharge     FINAL IMPRESSION:     1. Abdominal pain, unspecified abdominal location    2.  Vaginal candidiasis        DISPOSITION:         DISPOSITION:  [x]  Discharge - Home   []  Transfer -    []  Admission -     []  Against Medical Advice   []  Eloped   FOLLOW-UP: Miko Mix MD  [address]  [phone]    Schedule an appointment as soon as possible for a visit in 5 days  for reevaluation of abdominal pain    [facility]  [address]  Go to   If symptoms worsen     DISCHARGE MEDICATIONS: Discharge Medication List as of 7/6/2020  8:39 AM              Divya Gutierrez DO  Emergency Medicine Resident  [address] [facility]        Divya Gutierrez [place]  Resident  07/06/20 7614

## 2020-07-06 NOTE — ED NOTES
Dr. Renetta Thapa at bedside to update patient  No needs at this time  Respirations even and non-labored     Cheli Hook RN  07/06/20 9502

## 2020-07-06 NOTE — ED NOTES
Patient resting comfortably on stretcher, in no apparent distress  Respirations even and non-labored  Patient has no needs at this time  Call light remains within reach     Carrie Triplett, [address] [facility]  07/06/20 4863

## 2020-07-06 NOTE — ED NOTES
Report received from Hemant Matamoros, [facility]. Pt resting on stretcher, in NAD, RR even and unlabored. Pt updated on plan of care. Will continue to monitor. Call light within reach.        Cherelle Lynn RN  07/06/20 4293

## 2020-07-06 NOTE — ED PROVIDER NOTES
[address]  ED  eMERGENCY dEPARTMENT eNCOUnter   Attending Attestation     Pt Name: Antonia Quinonez  MRN: [id number]  [place] 1990  Date of evaluation: 7/6/20       Antonia Quinonez is a 30 y.o. female who presents with Abdominal Pain      History: Patient awoke with abdominal pain and went to the bathroom. Patient was sitting on toilet when she had a near syncopal event. Patient said the pain continued. Patient is says she did urinate. Patient has no other complaints. She has some suprapubic tenderness and pain at this time but it is much improved. Last bowel movement was yesterday. Exam: Heart rate and rhythm are regular. Lungs are clear to auscultation bilaterally. Abdomen is soft, mild tenderness over the suprapubic region. Patient is well-appearing. Plan for basic labs, urine, pregnancy test, if improved and negative work-up plan for discharge. I performed a history and physical examination of the patient and discussed management with the resident. I reviewed the residents note and agree with the documented findings and plan of care. Any areas of disagreement are noted on the chart. I was personally present for the key portions of any procedures. I have documented in the chart those procedures where I was not present during the key portions. I have personally reviewed all images and agree with the resident's interpretation. I have reviewed the emergency nurses triage note. I agree with the chief complaint, past medical history, past surgical history, allergies, medications, social and family history as documented unless otherwise noted below. Documentation of the HPI, Physical Exam and Medical Decision Making performed by medical students or scribes is based on my personal performance of the HPI, PE and MDM.  For Phys Assistant/ Nurse Practitioner cases/documentation I have had a face to face evaluation of this patient and have completed at least one if not all key elements of the E/M (history, physical exam, and MDM). Additional findings are as noted. For APC cases I have personally evaluated and examined the patient in conjunction with the APC and agree with the treatment plan and disposition of the patient as recorded by the APC.     Manisha Lee MD  Attending Emergency  Physician       Sherry Morrell MD  07/06/20 6766

## 2020-07-06 NOTE — ED PROVIDER NOTES
[facility] ED  Emergency Department Encounter  Emergency Medicine Resident     Pt Name: Ismael Hartman  MRN: [id number]  [place] 1990  Date of evaluation: 7/6/20  PCP:  Haritha Foster MD    [address] [place] [address]       Chief Complaint   Patient presents with    Abdominal Pain       HISTORY OFPRESENT ILLNESS  (Location/Symptom, Timing/Onset, Context/Setting, Quality, Duration, Modifying Factors,Severity.)      Ismael Hartman is a 30 y. o.yo female who presents with nonspecific generalized abdominal pain which woke her up from sleep. She states that she got up to go use the bathroom she did urinate and felt that she needed to have a bowel movement. As she was having a bowel movement she felt that she was about to pass out. During this episode she states that she became really flushed, felt hot, felt slightly sweaty. This lasted several minutes and then resolved. She felt nauseous but no emesis. Denies any vaginal bleeding which is abnormal for her, no vaginal discharge, or vaginal pain. Denies dysuria or hematuria. PAST MEDICAL / SURGICAL / SOCIAL / FAMILY HISTORY      has a past medical history of Anemia during pregnancy, Irritable bowel syndrome, Kidney stone, and Neurocardiogenic syncope.     has a past surgical history that includes Gynecologic cryosurgery; Colonoscopy; Sigmoidoscopy (10/27/2014); Upper gastrointestinal endoscopy (01/16/2012); Upper gastrointestinal endoscopy (08/29/2018); Colonoscopy (08/29/2018); pr colon ca scrn not hi rsk ind (N/A, 8/29/2018); and Upper gastrointestinal endoscopy (8/29/2018).      Social History     Socioeconomic History    Marital status:      Spouse name: Not on file    Number of children: Not on file    Years of education: Not on file    Highest education level: Not on file   Occupational History    Not on file   Social Needs    Financial resource strain: Not on file    Food insecurity     Worry: Not on file     Inability: Not congestion and rhinorrhea. Eyes: Negative for photophobia and visual disturbance. Respiratory: Negative for shortness of breath and wheezing. Cardiovascular: Negative for chest pain and palpitations. Gastrointestinal: Positive for abdominal pain and nausea. Negative for vomiting. Genitourinary: Negative for dysuria. Musculoskeletal: Negative for back pain. Skin: Negative for pallor. Neurological: Positive for light-headedness. Negative for dizziness and headaches. PHYSICAL EXAM   (up to 7 for level 4, 8 or more forlevel 5)      INITIAL VITALS:   ED Triage Vitals [07/06/20 0441]   BP Temp Temp Source Pulse Resp SpO2 Height Weight   103/71 98.1 °F (36.7 °C) Oral 80 14 100 % 5' 2\" (1.575 m) 132 lb (59.9 kg)       Physical Exam  Constitutional:       General: She is not in acute distress. Appearance: She is well-developed. HENT:      Head: Normocephalic and atraumatic. Eyes:      Extraocular Movements: Extraocular movements intact. Conjunctiva/sclera: Conjunctivae normal.   Neck:      Musculoskeletal: Normal range of motion and neck supple. Cardiovascular:      Rate and Rhythm: Normal rate. Heart sounds: Normal heart sounds. Pulmonary:      Effort: Pulmonary effort is normal.      Breath sounds: Normal breath sounds. Abdominal:      Comments: Abdomen is soft and nondistended. There is some discomfort in the left lower quadrant and epigastric region which is mild. There is no rebound or guarding. Callahan sign. No focal tenderness over McBurney's point. Musculoskeletal: Normal range of motion. General: No deformity. Skin:     General: Skin is warm. Capillary Refill: Capillary refill takes less than 2 seconds. Neurological:      Mental Status: She is alert and oriented to person, place, and time. Mental status is at baseline.          DIFFERENTIAL  DIAGNOSIS     PLAN (LABS / IMAGING / EKG):  Orders Placed This Encounter   Procedures    VAGINITIS DNA PROBE    C.trachomatis N.gonorrhoeae DNA    Comprehensive Metabolic Panel    CBC Auto Differential    Lipase    HCG Qualitative, Serum    Urinalysis with Microscopic    Orthostatic blood pressure and pulse    Vaginal exam    EKG 12 Lead       MEDICATIONS ORDERED:  Orders Placed This Encounter   Medications    0.9 % sodium chloride bolus    fluconazole (DIFLUCAN) tablet 150 mg       Initial MDM/Plan: 30 y.o. female who presents with neurolyse abdominal pain which woke her up from sleep. Patient describes a near syncopal episode which clinically seems to be a vasovagal episode. Will administer fluids, EKG, obtain orthostatic vital signs. We will also obtain abdominal evaluation including CBC, CMP, lipase, pregnancy test, urinalysis. Pain is felt to be not in the pelvic region have a very low suspicion for PID, TOA, ectopic pregnancy. Again, will obtain pregnancy regardless. Consider cholecystitis although pain is not in the right upper quadrant. Consider appendicitis with the pain is not in the right lower quadrant. Consider diverticulitis however patient is a young 78-year-old to have a lower suspicion of this. Do not feel that CT imaging is appropriate at this time. Will up evaluation as discussed, disposition to be determined. DIAGNOSTIC RESULTS / EMERGENCYDEPARTMENT COURSE / MDM     LABS:  Labs Reviewed   VAGINITIS DNA PROBE - Abnormal; Notable for the following components:       Result Value    Direct Exam POSITIVE for Candida sp.  (*)     All other components within normal limits   COMPREHENSIVE METABOLIC PANEL - Abnormal; Notable for the following components:    Potassium 3.6 (*)     Total Bilirubin 0.16 (*)     All other components within normal limits   CBC WITH AUTO DIFFERENTIAL - Abnormal; Notable for the following components:    Eosinophils % 6 (*)     Absolute Eos # 0.47 (*)     All other components within normal limits   URINALYSIS WITH MICROSCOPIC - Abnormal; Notable for the [address] [facility] [address] [phone]    Schedule an appointment as soon as possible for a visit in 5 days  for reevaluation of abdominal pain    [facility]  [address] [facility] [address]  [phone]  Go to   If symptoms worsen      DISCHARGE MEDICATIONS:  New Prescriptions    No medications on file       Allie Quintero DO  Emergency Medicine Resident    (Please note that portions of this note were completed with a voice recognition program.Efforts were made to edit the dictations but occasionally words are mis-transcribed.)        Allie Quintero DO  Resident  07/06/20 7314

## 2020-07-07 LAB
EKG ATRIAL RATE: 76 BPM
EKG P AXIS: 58 DEGREES
EKG P-R INTERVAL: 148 MS
EKG Q-T INTERVAL: 378 MS
EKG QRS DURATION: 84 MS
EKG QTC CALCULATION (BAZETT): 425 MS
EKG R AXIS: 60 DEGREES
EKG T AXIS: 29 DEGREES
EKG VENTRICULAR RATE: 76 BPM

## 2020-07-07 PROCEDURE — 93010 ELECTROCARDIOGRAM REPORT: CPT | Performed by: INTERNAL MEDICINE

## 2020-08-25 ENCOUNTER — OFFICE VISIT (OUTPATIENT)
Dept: FAMILY MEDICINE CLINIC | Age: 30
End: 2020-08-25
Payer: MEDICAID

## 2020-08-25 VITALS
BODY MASS INDEX: 23.59 KG/M2 | DIASTOLIC BLOOD PRESSURE: 60 MMHG | WEIGHT: 129 LBS | HEART RATE: 60 BPM | SYSTOLIC BLOOD PRESSURE: 98 MMHG | TEMPERATURE: 99 F

## 2020-08-25 PROBLEM — E87.6 HYPOKALEMIA: Status: ACTIVE | Noted: 2020-08-25

## 2020-08-25 PROBLEM — E55.9 VITAMIN D DEFICIENCY: Status: ACTIVE | Noted: 2020-08-25

## 2020-08-25 PROCEDURE — 99203 OFFICE O/P NEW LOW 30 MIN: CPT | Performed by: FAMILY MEDICINE

## 2020-08-25 PROCEDURE — G8420 CALC BMI NORM PARAMETERS: HCPCS | Performed by: FAMILY MEDICINE

## 2020-08-25 PROCEDURE — 1036F TOBACCO NON-USER: CPT | Performed by: FAMILY MEDICINE

## 2020-08-25 PROCEDURE — G8427 DOCREV CUR MEDS BY ELIG CLIN: HCPCS | Performed by: FAMILY MEDICINE

## 2020-08-25 ASSESSMENT — ENCOUNTER SYMPTOMS
ABDOMINAL PAIN: 0
SHORTNESS OF BREATH: 0
CHEST TIGHTNESS: 0
VOICE CHANGE: 0
BLOOD IN STOOL: 0

## 2020-08-25 ASSESSMENT — PATIENT HEALTH QUESTIONNAIRE - PHQ9
1. LITTLE INTEREST OR PLEASURE IN DOING THINGS: 0
SUM OF ALL RESPONSES TO PHQ QUESTIONS 1-9: 0
2. FEELING DOWN, DEPRESSED OR HOPELESS: 0
SUM OF ALL RESPONSES TO PHQ9 QUESTIONS 1 & 2: 0
SUM OF ALL RESPONSES TO PHQ QUESTIONS 1-9: 0

## 2020-08-25 NOTE — PROGRESS NOTES
that she has a family history of thyroid problems. She denies any fever, chills, chest pain or shortness of breath. She states she is taking and tolerating her routine medication. Review of Systems   Constitutional: Negative for chills and fever. HENT: Negative for congestion and voice change. Respiratory: Negative for chest tightness and shortness of breath. Cardiovascular: Negative for chest pain. Gastrointestinal: Negative for abdominal pain and blood in stool. Genitourinary: Negative for dysuria and hematuria. Skin: Negative for rash. Objective:   Physical Exam  Vitals signs and nursing note reviewed. Constitutional:       General: She is not in acute distress. Appearance: She is well-developed. HENT:      Head: Normocephalic and atraumatic. Right Ear: Tympanic membrane, ear canal and external ear normal.      Left Ear: Tympanic membrane, ear canal and external ear normal.      Nose: Nose normal.      Mouth/Throat:      Mouth: Mucous membranes are moist.      Pharynx: Oropharynx is clear. Eyes:      General: No scleral icterus. Right eye: No discharge. Left eye: No discharge. Conjunctiva/sclera: Conjunctivae normal.   Neck:      Musculoskeletal: Neck supple. Cardiovascular:      Rate and Rhythm: Normal rate and regular rhythm. Heart sounds: Normal heart sounds. Pulmonary:      Effort: Pulmonary effort is normal. No respiratory distress. Breath sounds: Normal breath sounds. No wheezing. Abdominal:      General: There is no distension. Palpations: Abdomen is soft. Tenderness: There is no abdominal tenderness. Skin:     General: Skin is warm and dry. Findings: No rash. Neurological:      Mental Status: She is alert and oriented to person, place, and time. Psychiatric:         Mood and Affect: Mood normal.         Behavior: Behavior normal.         Assessment:       Diagnosis Orders   1.  Vitamin D deficiency  Vitamin D 25 Hydroxy   2. Hypokalemia  Basic Metabolic Panel   3. Hair loss  TSH without Reflex    T3, Free    T4, Free    Basic Metabolic Panel   4. Lipid screening  Lipid Panel           Plan:      Orders Placed This Encounter   Procedures    TSH without Reflex     Standing Status:   Future     Standing Expiration Date:   8/25/2021    T3, Free     Standing Status:   Future     Standing Expiration Date:   8/25/2021    T4, Free     Standing Status:   Future     Standing Expiration Date:   8/25/2021    Basic Metabolic Panel     Fasting     Standing Status:   Future     Standing Expiration Date:   8/25/2021    Lipid Panel     Standing Status:   Future     Standing Expiration Date:   8/25/2021     Order Specific Question:   Is Patient Fasting?/# of Hours     Answer:    Fast 8-10 hours    Vitamin D 25 Hydroxy     Standing Status:   Future     Standing Expiration Date:   8/25/2021         Continue routine medication  Follow-up in 6 months or sooner if needed

## 2020-08-27 ENCOUNTER — HOSPITAL ENCOUNTER (OUTPATIENT)
Age: 30
Setting detail: SPECIMEN
Discharge: HOME | End: 2020-08-27
Payer: MEDICAID

## 2020-08-27 LAB
ANION GAP SERPL CALCULATED.3IONS-SCNC: 14 MMOL/L (ref 9–17)
BUN BLDV-MCNC: 12 MG/DL (ref 6–20)
BUN/CREAT BLD: NORMAL (ref 9–20)
CALCIUM SERPL-MCNC: 8.8 MG/DL (ref 8.6–10.4)
CHLORIDE BLD-SCNC: 104 MMOL/L (ref 98–107)
CHOLESTEROL/HDL RATIO: 3.3
CHOLESTEROL: 174 MG/DL
CO2: 21 MMOL/L (ref 20–31)
CREAT SERPL-MCNC: 0.69 MG/DL (ref 0.5–0.9)
GFR AFRICAN AMERICAN: >60 ML/MIN
GFR NON-AFRICAN AMERICAN: >60 ML/MIN
GFR SERPL CREATININE-BSD FRML MDRD: NORMAL ML/MIN/{1.73_M2}
GFR SERPL CREATININE-BSD FRML MDRD: NORMAL ML/MIN/{1.73_M2}
GLUCOSE BLD-MCNC: 86 MG/DL (ref 70–99)
HDLC SERPL-MCNC: 52 MG/DL
LDL CHOLESTEROL: 101 MG/DL (ref 0–130)
POTASSIUM SERPL-SCNC: 4.4 MMOL/L (ref 3.7–5.3)
SODIUM BLD-SCNC: 139 MMOL/L (ref 135–144)
T3 FREE: 3.22 PG/ML (ref 2.02–4.43)
THYROXINE, FREE: 1.12 NG/DL (ref 0.93–1.7)
TRIGL SERPL-MCNC: 103 MG/DL
TSH SERPL DL<=0.05 MIU/L-ACNC: 3.54 MIU/L (ref 0.3–5)
VITAMIN D 25-HYDROXY: 60 NG/ML (ref 30–100)
VLDLC SERPL CALC-MCNC: NORMAL MG/DL (ref 1–30)

## 2020-11-06 ENCOUNTER — TELEPHONE (OUTPATIENT)
Dept: FAMILY MEDICINE CLINIC | Age: 30
End: 2020-11-06

## 2020-11-06 NOTE — TELEPHONE ENCOUNTER
[facility] CALLED STATING PT HAS AN UPCOMING APPOINTMENT ON 12/9/2020 FOR HAIR LOSS AND NEEDS A REFERRAL PLACED. REFERRAL IS PENDED IF OK TO PLACE. PLEASE ADVISE.

## 2020-12-09 ENCOUNTER — OFFICE VISIT (OUTPATIENT)
Dept: DERMATOLOGY | Age: 30
End: 2020-12-09
Payer: MEDICAID

## 2020-12-09 ENCOUNTER — HOSPITAL ENCOUNTER (OUTPATIENT)
Age: 30
Setting detail: SPECIMEN
Discharge: HOME | End: 2020-12-09
Payer: MEDICAID

## 2020-12-09 VITALS
HEART RATE: 97 BPM | DIASTOLIC BLOOD PRESSURE: 70 MMHG | BODY MASS INDEX: 23.07 KG/M2 | WEIGHT: 130.2 LBS | HEIGHT: 63 IN | SYSTOLIC BLOOD PRESSURE: 116 MMHG | OXYGEN SATURATION: 97 % | TEMPERATURE: 97.7 F

## 2020-12-09 LAB
ABSOLUTE EOS #: 0.45 K/UL (ref 0–0.44)
ABSOLUTE IMMATURE GRANULOCYTE: <0.03 K/UL (ref 0–0.3)
ABSOLUTE LYMPH #: 1.93 K/UL (ref 1.1–3.7)
ABSOLUTE MONO #: 0.36 K/UL (ref 0.1–1.2)
ALBUMIN SERPL-MCNC: 4.3 G/DL (ref 3.5–5.2)
ALBUMIN/GLOBULIN RATIO: 1.4 (ref 1–2.5)
ALP BLD-CCNC: 48 U/L (ref 35–104)
ALT SERPL-CCNC: 14 U/L (ref 5–33)
ANION GAP SERPL CALCULATED.3IONS-SCNC: 13 MMOL/L (ref 9–17)
AST SERPL-CCNC: 25 U/L
BASOPHILS # BLD: 1 % (ref 0–2)
BASOPHILS ABSOLUTE: 0.04 K/UL (ref 0–0.2)
BILIRUB SERPL-MCNC: 0.29 MG/DL (ref 0.3–1.2)
BUN BLDV-MCNC: 12 MG/DL (ref 6–20)
BUN/CREAT BLD: ABNORMAL (ref 9–20)
CALCIUM SERPL-MCNC: 9.6 MG/DL (ref 8.6–10.4)
CHLORIDE BLD-SCNC: 104 MMOL/L (ref 98–107)
CO2: 24 MMOL/L (ref 20–31)
CREAT SERPL-MCNC: 0.69 MG/DL (ref 0.5–0.9)
DHEAS (DHEA SULFATE): 204 UG/DL (ref 45–270)
DIFFERENTIAL TYPE: ABNORMAL
EOSINOPHILS RELATIVE PERCENT: 7 % (ref 1–4)
FERRITIN: 43 UG/L (ref 13–150)
GFR AFRICAN AMERICAN: >60 ML/MIN
GFR NON-AFRICAN AMERICAN: >60 ML/MIN
GFR SERPL CREATININE-BSD FRML MDRD: ABNORMAL ML/MIN/{1.73_M2}
GFR SERPL CREATININE-BSD FRML MDRD: ABNORMAL ML/MIN/{1.73_M2}
GLUCOSE BLD-MCNC: 106 MG/DL (ref 70–99)
HCT VFR BLD CALC: 43 % (ref 36.3–47.1)
HEMOGLOBIN: 13.8 G/DL (ref 11.9–15.1)
IMMATURE GRANULOCYTES: 0 %
IRON SATURATION: 37 % (ref 20–55)
IRON: 137 UG/DL (ref 37–145)
LYMPHOCYTES # BLD: 28 % (ref 24–43)
MCH RBC QN AUTO: 28.8 PG (ref 25.2–33.5)
MCHC RBC AUTO-ENTMCNC: 32.1 G/DL (ref 28.4–34.8)
MCV RBC AUTO: 89.8 FL (ref 82.6–102.9)
MONOCYTES # BLD: 5 % (ref 3–12)
NRBC AUTOMATED: 0 PER 100 WBC
PDW BLD-RTO: 12.8 % (ref 11.8–14.4)
PLATELET # BLD: 326 K/UL (ref 138–453)
PLATELET ESTIMATE: ABNORMAL
PMV BLD AUTO: 8.9 FL (ref 8.1–13.5)
POTASSIUM SERPL-SCNC: 4.4 MMOL/L (ref 3.7–5.3)
RBC # BLD: 4.79 M/UL (ref 3.95–5.11)
RBC # BLD: ABNORMAL 10*6/UL
SEG NEUTROPHILS: 59 % (ref 36–65)
SEGMENTED NEUTROPHILS ABSOLUTE COUNT: 4.14 K/UL (ref 1.5–8.1)
SEX HORMONE BINDING GLOBULIN: 142 NMOL/L (ref 30–135)
SODIUM BLD-SCNC: 141 MMOL/L (ref 135–144)
TESTOSTERONE FREE-NONMALE: 6.7 PG/ML (ref 1.3–9.2)
TESTOSTERONE TOTAL: 109 NG/DL (ref 20–70)
TESTOSTERONE, BIOAVAILABLE: 15.7 NG/DL (ref 4.1–25.5)
TOTAL IRON BINDING CAPACITY: 372 UG/DL (ref 250–450)
TOTAL PROTEIN: 7.3 G/DL (ref 6.4–8.3)
UNSATURATED IRON BINDING CAPACITY: 235 UG/DL (ref 112–347)
WBC # BLD: 6.9 K/UL (ref 3.5–11.3)
WBC # BLD: ABNORMAL 10*3/UL

## 2020-12-09 PROCEDURE — G8484 FLU IMMUNIZE NO ADMIN: HCPCS | Performed by: DERMATOLOGY

## 2020-12-09 PROCEDURE — G8420 CALC BMI NORM PARAMETERS: HCPCS | Performed by: DERMATOLOGY

## 2020-12-09 PROCEDURE — 1036F TOBACCO NON-USER: CPT | Performed by: DERMATOLOGY

## 2020-12-09 PROCEDURE — 99202 OFFICE O/P NEW SF 15 MIN: CPT | Performed by: DERMATOLOGY

## 2020-12-09 PROCEDURE — G8427 DOCREV CUR MEDS BY ELIG CLIN: HCPCS | Performed by: DERMATOLOGY

## 2020-12-09 NOTE — PATIENT INSTRUCTIONS
- Labs today  - If labs are normal, we will do a punch biopsy of the scalp  - Rogaine (out-of-pocket) daily      How To Use Minoxidil (Rogaine)   Use mens strength: 5%    Once or twice/day  o Much better to use regularly once a day than inconsistently every other day    Only apply medication where you actually want your hair to grow  o Can cause hair to grow on face, neck, hands, etc. if not washed off  o Tips for application: wear gloves or wash hands with soap and water immediately      Important things to know:  o Need to use for ~8-12 months to see effects  o Medication must be used consistently and continuously for a sustained benefit   o May notice some hair loss when starting medication   - This is due to the medication converting the hair from a weaker hair into a stronger hair  o Will notice hair loss when medication is stopped   - Will lose only newly grown hair gained from using medicine  - Will NOT speed up hair loss, normal loss starts again     Cheapest place to buy Minoxidil is [facility]   o ~$50 = 6 month supply

## 2020-12-09 NOTE — PROGRESS NOTES
Appearance: No acute distress, Well nourished     Neuro: Alert and oriented to person, place and time  Psych: Normal affect   Lymph Node: Not performed    Cutaneous Exam: Performed as documented in clinic note below. Sun-exposed skin, which includes the head/face, neck, both arms, digits and/or nails was examined. Pertinent Physical Exam Findings:  Physical Exam  Hair with significantly reduced density. No erythema, scale, or evidence of scarring  Patient has hair fiber  her scalp and extensions    Photo surveillance performed: No    Medical Necessity of Exam Performed:   Distribution of patient concerns    Additional Diagnostic Testing performed during exam: Not performed ,  Not performed    ASSESSMENT:   Diagnosis Orders   1. Hair loss     2. Alopecia  DHEA-Sulfate    TESTOSTERONE FREE BIO TOTAL    CBC Auto Differential    Iron and TIBC    Ferritin    MALIHA Screen with Reflex    Celiac Disease Panel    Comprehensive Metabolic Panel   3. Positive MALIHA (antinuclear antibody)  CBC Auto Differential    MALIHA Screen with Reflex    Celiac Disease Panel   4. Family history of celiac disease  CBC Auto Differential    Iron and TIBC    Ferritin    MALIHA Screen with Reflex    Celiac Disease Panel       Plan of Action is as Follows:  Assessment   1. Alopecia - suspect chronic telogen effluvium  - given h/o positive MALIHA and FH of celiac, laboratory w/u for underlying cause  - if not revealing, consider skin biopsy  - DHEA-Sulfate; Future  - TESTOSTERONE FREE BIO TOTAL; Future  - CBC Auto Differential; Future  - Iron and TIBC; Future  - Ferritin; Future  - MALIHA Screen with Reflex; Future  - Celiac Disease Panel; Future  - Comprehensive Metabolic Panel; Future    3. Positive MALIHA (antinuclear antibody)  - CBC Auto Differential; Future  - MALIHA Screen with Reflex; Future  - Celiac Disease Panel; Future    4. Family history of celiac disease  - CBC Auto Differential; Future  - Iron and TIBC; Future  - Ferritin;  Future  - MALIHA Screen with Reflex; Future    RTC pending labs  - Celiac Disease Panel; Future                Patient Instructions   - Labs today  - If labs are normal, we will do a punch biopsy of the scalp  - Rogaine (out-of-pocket) daily      How To Use Minoxidil (Rogaine)   Use mens strength: 5%    Once or twice/day  o Much better to use regularly once a day than inconsistently every other day    Only apply medication where you actually want your hair to grow  o Can cause hair to grow on face, neck, hands, etc. if not washed off  o Tips for application: wear gloves or wash hands with soap and water immediately      Important things to know:  o Need to use for ~8-12 months to see effects  o Medication must be used consistently and continuously for a sustained benefit   o May notice some hair loss when starting medication   - This is due to the medication converting the hair from a weaker hair into a stronger hair  o Will notice hair loss when medication is stopped   - Will lose only newly grown hair gained from using medicine  - Will NOT speed up hair loss, normal loss starts again     Cheapest place to buy Minoxidil is [facility]   o ~$50 = 6 month supply             Follow-up: No follow-ups on file. This note was created with the assistance of a speech-recognition program.  Although the intention is to generate a document that actually reflects the content of the visit, no guarantees can be provided that every mistake has been identified and corrected byediting.     Electronically signed by Mar Morrow MD on 12/9/20 at 9:15 AM EST

## 2020-12-10 ENCOUNTER — TELEPHONE (OUTPATIENT)
Dept: DERMATOLOGY | Age: 30
End: 2020-12-10

## 2020-12-10 LAB
ANTI-NUCLEAR ANTIBODY (ANA): NEGATIVE
GLIADIN DEAMINIDATED PEPTIDE AB IGA: 0.7 U/ML
GLIADIN DEAMINIDATED PEPTIDE AB IGG: <0.4 U/ML
IGA: 91 MG/DL (ref 70–400)
TISSUE TRANSGLUTAMINASE IGA: 0.1 U/ML

## 2020-12-10 NOTE — TELEPHONE ENCOUNTER
Please inform patient that her labs were normal aside from high total testosterone. This is likely due to her birth control and an inconsequential findings. However, I would like her to discuss with OBGYN to make sure it is not an issue that could be leading to hair loss. In the meantime, I think we should schedule an appointment for a biopsy. Let me know if she would like a referral to obgyn.

## 2020-12-11 NOTE — TELEPHONE ENCOUNTER
Pt advised and scheduled for punch bx. She has an OBGYN, so will discuss with that provider.     Future Appointments   Date Time Provider Bailey Grecia   12/17/2020  9:45 AM Sheron Martino MD [id number]   2/26/2021  1:30 PM Marilou Garcia MD [facility]

## 2020-12-15 ENCOUNTER — OFFICE VISIT (OUTPATIENT)
Dept: DERMATOLOGY | Age: 30
End: 2020-12-15
Payer: MEDICAID

## 2020-12-15 VITALS
BODY MASS INDEX: 23.04 KG/M2 | WEIGHT: 130 LBS | OXYGEN SATURATION: 100 % | HEART RATE: 75 BPM | SYSTOLIC BLOOD PRESSURE: 124 MMHG | TEMPERATURE: 97.3 F | DIASTOLIC BLOOD PRESSURE: 80 MMHG | HEIGHT: 63 IN

## 2020-12-15 PROCEDURE — 1036F TOBACCO NON-USER: CPT | Performed by: DERMATOLOGY

## 2020-12-15 PROCEDURE — G8427 DOCREV CUR MEDS BY ELIG CLIN: HCPCS | Performed by: DERMATOLOGY

## 2020-12-15 PROCEDURE — G8420 CALC BMI NORM PARAMETERS: HCPCS | Performed by: DERMATOLOGY

## 2020-12-15 PROCEDURE — 11104 PUNCH BX SKIN SINGLE LESION: CPT | Performed by: DERMATOLOGY

## 2020-12-15 PROCEDURE — G8484 FLU IMMUNIZE NO ADMIN: HCPCS | Performed by: DERMATOLOGY

## 2020-12-15 PROCEDURE — 99213 OFFICE O/P EST LOW 20 MIN: CPT | Performed by: DERMATOLOGY

## 2020-12-15 NOTE — PATIENT INSTRUCTIONS
-Apply a pea-sized amount of Tretinoin 0.025% cream/onto your finger. Dab the medicine onto your forehead, nose, chin, and each cheek. Then gently spread a thin layer across the entire face. Do not wash off this medicine.    -Consider Spirolactone in the future. Common side effects of spironolactone include increased urination, irregular periods with mid-cycle spotting, breast tenderness, decreased sex drive, fatigue, headache, and dizziness. Stop taking medication and to immediately report any new onset muscle cramps or weakness, or palpitations. Pregnancy needs to be avoided by use of birth control in order to prevent feminization of a male fetus. Stay away from potassium supplements while on the medication as there is a risk of high potassium levels. BIOPSY WOUND CARE    A biopsy is where a small piece of skin tissue is removed and examined by a pathologist.  When a biopsy is done, there is a small wound site that requires proper care to prevent infection and scarring. Some biopsies require sutures and their removal.    How to Care for Biopsy Wound    A.  Leave band-aid or dressing on for 24 hours. B. Wash two times a day with soap and water. C.  Let the wound air dry, then apply Vaseline ointment and cover with a Band-Aid       unless otherwise instructed by your provider. D. If there is slight discomfort, you may give acetaminophen or ibuprofen. When To Call the Doctor    Call the Dermatology Clinic or your doctor if any of the following occur:    A. Redness and swelling  B. Tenderness and warm to touch  C.  Drainage from wound  D. Fever        Biopsy Results    Biopsy results are usually available in 1-2 weeks. We provide biopsy results in letters for begin results or we will call for any concerning results. If you have not heard from our staff please call the office within 2 weeks. Please call our office with any concerns at [phone].

## 2020-12-16 ENCOUNTER — HOSPITAL ENCOUNTER (OUTPATIENT)
Age: 30
Setting detail: SPECIMEN
Discharge: HOME | End: 2020-12-16
Payer: MEDICAID

## 2020-12-16 RX ORDER — LIDOCAINE HYDROCHLORIDE AND EPINEPHRINE 10; 10 MG/ML; UG/ML
20 INJECTION, SOLUTION INFILTRATION; PERINEURAL ONCE
Status: COMPLETED | OUTPATIENT
Start: 2020-12-16 | End: 2020-12-16

## 2020-12-16 RX ADMIN — LIDOCAINE HYDROCHLORIDE AND EPINEPHRINE 20 ML: 10; 10 INJECTION, SOLUTION INFILTRATION; PERINEURAL at 09:38

## 2020-12-16 NOTE — PROGRESS NOTES
Dermatology Patient Note  [facility]  [address]  [address] [id number]  Dept: [phone]  Dept Fax: [phone]      VISITDATE: 12/15/2020   REFERRING PROVIDER: No ref. provider found      Janine Arias is a 26 y.o. female  who presents today in the office for:    Other (Pt is here to have a bx. Her Ob-Gyn does not think it's her birthcontol)      HISTORY OF PRESENT ILLNESS:  Patient presents for f/u alopecia. Interval history: labs revealed elevated total testosterone and SHBG but normal free testosterone, all other labs were normal. She discussed these results with her OBGYN who did not think it was related to her birth control. Here today for punch biopsy  Current treatment and adherence: none  Prior treatments tried: see initial HPI    Also complains of acne along jawline    CURRENT MEDICATIONS:   Current Outpatient Medications   Medication Sig Dispense Refill    tretinoin (RETIN-A) 0.025 % cream Apply pea sized amount to face nightly 45 g 3    acetaminophen (TYLENOL) 500 MG tablet Take 500 mg by mouth every 4 hours as needed       Norethin Ace-Eth Estrad-FE (BLISOVI FE 1/20 PO) Take by mouth daily       Current Facility-Administered Medications   Medication Dose Route Frequency Provider Last Rate Last Admin    lidocaine-EPINEPHrine 1 percent-1:051385 injection 20 mL  20 mL Intradermal Once Qiana To MD           ALLERGIES:   Allergies   Allergen Reactions    Benzoyl Peroxide     Benzyl Alcohol Swelling     Acne medication. SOCIAL HISTORY:  Social History     Tobacco Use    Smoking status: Never Smoker    Smokeless tobacco: Never Used   Substance Use Topics    Alcohol use: Yes     Comment: rare       REVIEW OF SYSTEMS:  Review of Systems  Skin: Denies any new changing, growing orbleeding lesions or rashes except as described in the HPI   Constitutional: Denies fevers, chills, and malaise.     PHYSICAL EXAM:   /80 (Site: Right Upper Arm, Position: Sitting, Cuff Size: Medium Adult)   Pulse 75   Temp 97.3 °F (36.3 °C) (Temporal)   Ht 5' 2.75\" (1.594 m)   Wt 130 lb (59 kg)   SpO2 100%   BMI 23.21 kg/m²     General Exam:  General Appearance: No acute distress, Well nourished     Neuro: Alert and oriented to person, place and time  Psych: Normal affect   Lymph Node: Not performed    Cutaneous Exam: Performed as documented in clinic note below. Sun-exposed skin, which includes the head/face, neck, both arms, digits and/or nails was examined. Pertinent Physical Exam Findings:  Physical Exam  Diffuse thinning of hair of scalp  Acneiform papules of jawline    Photo surveillance performed: No    Medical Necessity of Exam Performed:   Distribution of patient concerns    Additional Diagnostic Testing performed during exam: Not performed ,  Not performed    ASSESSMENT:   Diagnosis Orders   1. Alopecia  Surgical Pathology    DC PUNCH BIOPSY SKIN SINGLE LESION    lidocaine-EPINEPHrine 1 percent-1:277562 injection 20 mL   2. Acne vulgaris  tretinoin (RETIN-A) 0.025 % cream       Plan of Action is as Follows:  Assessment   1. Alopecia - telogen effluvium vs. Androgenetic vs. Alopecia areata  Punch Biopsy: The procedure and its risks were explained including but not limited to pain, bleeding, infection, permanent scar, permanent pigment alteration and need for an additional procedure. Consent to proceed with the procedure was obtained from the patient or the parent. After cleaning with alcohol the alopecia was anesthetized with 1% lidocaine with epinephrine and a 4 mm punch was performed. Hemostasis was achieved with suture closure of the defect with 4-0 Prolene and Vaseline and a bandage were applied.  - Surgical Pathology; Future  - DC PUNCH BIOPSY SKIN SINGLE LESION  - lidocaine-EPINEPHrine 1 percent-1:694903 injection 20 mL    2.  Acne vulgaris  - consider spironolactone in the future (may also be used to treat hair loss)  - tretinoin (RETIN-A) 0.025 % cream; Apply pea sized amount to face nightly  Dispense: 45 g; Refill: 3    RTC pending path            Patient Instructions   -Apply a pea-sized amount of Tretinoin 0.025% cream/onto your finger. Dab the medicine onto your forehead, nose, chin, and each cheek. Then gently spread a thin layer across the entire face. Do not wash off this medicine.    -Consider Spirolactone in the future. Common side effects of spironolactone include increased urination, irregular periods with mid-cycle spotting, breast tenderness, decreased sex drive, fatigue, headache, and dizziness. Stop taking medication and to immediately report any new onset muscle cramps or weakness, or palpitations. Pregnancy needs to be avoided by use of birth control in order to prevent feminization of a male fetus. Stay away from potassium supplements while on the medication as there is a risk of high potassium levels. BIOPSY WOUND CARE    A biopsy is where a small piece of skin tissue is removed and examined by a pathologist.  When a biopsy is done, there is a small wound site that requires proper care to prevent infection and scarring. Some biopsies require sutures and their removal.    How to Care for Biopsy Wound    A.  Leave band-aid or dressing on for 24 hours. B. Wash two times a day with soap and water. C.  Let the wound air dry, then apply Vaseline ointment and cover with a Band-Aid       unless otherwise instructed by your provider. D. If there is slight discomfort, you may give acetaminophen or ibuprofen. When To Call the Doctor    Call the Dermatology Clinic or your doctor if any of the following occur:    A. Redness and swelling  B. Tenderness and warm to touch  C.  Drainage from wound  D. Fever        Biopsy Results    Biopsy results are usually available in 1-2 weeks. We provide biopsy results in letters for begin results or we will call for any concerning results.   If you have not heard from our staff please call the office within 2 weeks. Please call our office with any concerns at [phone]. Follow-up: No follow-ups on file. This note was created with the assistance of a speech-recognition program.  Although the intention is to generate a document that actually reflects the content of the visit, no guarantees can be provided that every mistake has been identified and corrected byediting.     Electronically signed by Fer Parikh MD on 12/16/20 at 9:23 AM EST

## 2020-12-18 ENCOUNTER — TELEPHONE (OUTPATIENT)
Dept: DERMATOLOGY | Age: 30
End: 2020-12-18

## 2020-12-18 LAB — DERMATOLOGY PATHOLOGY REPORT: NORMAL

## 2020-12-18 RX ORDER — SPIRONOLACTONE 50 MG/1
TABLET, FILM COATED ORAL
Qty: 30 TABLET | Refills: 2 | Status: SHIPPED | OUTPATIENT
Start: 2020-12-18 | End: 2021-03-15 | Stop reason: SDUPTHER

## 2020-12-18 NOTE — TELEPHONE ENCOUNTER
Pt phoned to see if she needs to start spironolactone. Reviewed progress note, advised pt we are still awaiting her biopsy results. She stated she has been off her OCP for a week now. She wonders if she needs to restart it? I asked if she is sexually active, she stated no. She was on it for the hormonal control.

## 2020-12-21 RX ORDER — FLUOCINONIDE TOPICAL SOLUTION USP, 0.05% 0.5 MG/ML
SOLUTION TOPICAL
Qty: 60 ML | Refills: 2 | Status: SHIPPED | OUTPATIENT
Start: 2020-12-21 | End: 2021-06-17 | Stop reason: ALTCHOICE

## 2020-12-21 NOTE — TELEPHONE ENCOUNTER
Pt had questions with regards to this portion of the result \"The subsequent transverse sections at the level of   the steatic ducts contain hair follicles with focal perifollicular   lymphocytic inflammation, but without evidence of interface dermatitis   or perifollicular fibrosis\" - she is asking if she needs to be on a steroid or if this needs to be taken care of? Please advise.

## 2020-12-29 ENCOUNTER — NURSE ONLY (OUTPATIENT)
Dept: DERMATOLOGY | Age: 30
End: 2020-12-29

## 2020-12-29 VITALS — TEMPERATURE: 98.4 F

## 2020-12-29 PROCEDURE — 99024 POSTOP FOLLOW-UP VISIT: CPT | Performed by: DERMATOLOGY

## 2020-12-29 NOTE — PROGRESS NOTES
Luz De La Paz presented for suture removal on the scalp. The biopsy site was well healed. The suture was removed and a band-aid applied. The patient left in good condition.

## 2020-12-31 ENCOUNTER — OFFICE VISIT (OUTPATIENT)
Dept: FAMILY MEDICINE CLINIC | Age: 30
End: 2020-12-31
Payer: MEDICAID

## 2020-12-31 VITALS
TEMPERATURE: 97.3 F | DIASTOLIC BLOOD PRESSURE: 70 MMHG | RESPIRATION RATE: 16 BRPM | WEIGHT: 127.6 LBS | SYSTOLIC BLOOD PRESSURE: 112 MMHG | HEART RATE: 76 BPM | BODY MASS INDEX: 22.78 KG/M2

## 2020-12-31 DIAGNOSIS — H93.13 TINNITUS OF BOTH EARS: Primary | ICD-10-CM

## 2020-12-31 DIAGNOSIS — J30.9 ALLERGIC RHINITIS, UNSPECIFIED SEASONALITY, UNSPECIFIED TRIGGER: ICD-10-CM

## 2020-12-31 PROCEDURE — 1036F TOBACCO NON-USER: CPT | Performed by: NURSE PRACTITIONER

## 2020-12-31 PROCEDURE — G8484 FLU IMMUNIZE NO ADMIN: HCPCS | Performed by: NURSE PRACTITIONER

## 2020-12-31 PROCEDURE — G8427 DOCREV CUR MEDS BY ELIG CLIN: HCPCS | Performed by: NURSE PRACTITIONER

## 2020-12-31 PROCEDURE — 99214 OFFICE O/P EST MOD 30 MIN: CPT | Performed by: NURSE PRACTITIONER

## 2020-12-31 PROCEDURE — G8420 CALC BMI NORM PARAMETERS: HCPCS | Performed by: NURSE PRACTITIONER

## 2020-12-31 RX ORDER — CETIRIZINE HYDROCHLORIDE 10 MG/1
CAPSULE, LIQUID FILLED ORAL
Qty: 30 CAPSULE | Refills: 2 | Status: SHIPPED | OUTPATIENT
Start: 2020-12-31 | End: 2021-06-17

## 2020-12-31 SDOH — ECONOMIC STABILITY: TRANSPORTATION INSECURITY
IN THE PAST 12 MONTHS, HAS THE LACK OF TRANSPORTATION KEPT YOU FROM MEDICAL APPOINTMENTS OR FROM GETTING MEDICATIONS?: NOT ASKED

## 2020-12-31 SDOH — ECONOMIC STABILITY: INCOME INSECURITY: HOW HARD IS IT FOR YOU TO PAY FOR THE VERY BASICS LIKE FOOD, HOUSING, MEDICAL CARE, AND HEATING?: NOT HARD AT ALL

## 2020-12-31 SDOH — ECONOMIC STABILITY: FOOD INSECURITY: WITHIN THE PAST 12 MONTHS, THE FOOD YOU BOUGHT JUST DIDN'T LAST AND YOU DIDN'T HAVE MONEY TO GET MORE.: NEVER TRUE

## 2020-12-31 NOTE — PROGRESS NOTES
Subjective:      Patient ID: James Mcdermott is a 26 y.o. female. Visit Information    Have you changed or started any medications since your last visit including any over-the-counter medicines, vitamins, or herbal medicines? no   Are you having any side effects from any of your medications? -  no  Have you stopped taking any of your medications? Is so, why? -  yes - Birth Control Pill    Have you seen any other physician or provider since your last visit? Yes - Records Obtained  Have you had any other diagnostic tests since your last visit? Yes - Records Obtained  Have you been seen in the emergency room and/or had an admission to a hospital since we last saw you? No  Have you had your routine dental cleaning in the past 6 months? no    Have you activated your [employer] account? If not, what are your barriers? Yes     Patient Care Team:  Lucero Morocho MD as PCP - General (Family Medicine)  Lucero Morocho MD as PCP - [facility] Empaneled Provider  Elaine Fernandez MD as Consulting Physician (Gastroenterology)    Medical History Review  Past Medical, Family, and Social History reviewed and does contribute to the patient presenting condition    Health Maintenance   Topic Date Due    HIV screen  08/21/2003    Cervical cancer screen  08/21/2009    Varicella vaccine (1 of 2 - 2-dose childhood series) 11/26/2009    Flu vaccine (1) 09/01/2020    Potassium monitoring  12/09/2021    Creatinine monitoring  12/09/2021    DTaP/Tdap/Td vaccine (2 - Td) 04/22/2024    Colon cancer screen colonoscopy  08/29/2028    Hepatitis C screen  Completed    Hepatitis A vaccine  Aged Out    Hepatitis B vaccine  Aged Out    Hib vaccine  Aged Out    Meningococcal (ACWY) vaccine  Aged Out    Pneumococcal 0-64 years Vaccine  Aged Out     HPI    26year old female presents with management of tinnitus and allergic rhinitis. Has had high pitched ringing sound in both ears for a few weeks, constant.  Admits mild nasal congestion post nasal drip and currently is not on any medication. States she had tinnitus in the past but it didn't last this long. She used to follow up with ENT. Denies fever chills dizziness/vertigo hearing loss , headache , cough sob or nv abd pain. Hx of alopecia managed by dermatologist.     Review of Systems   Constitutional: Negative for chills and fever. HENT: Positive for congestion, postnasal drip and tinnitus. Respiratory: Negative for shortness of breath and wheezing. Cardiovascular: Negative for chest pain and leg swelling. Gastrointestinal: Negative for abdominal pain and nausea. Neurological: Negative for dizziness, weakness and numbness. Objective:   Physical Exam  Vitals signs and nursing note reviewed. Constitutional:       General: She is not in acute distress. Appearance: Normal appearance. HENT:      Nose: Nose normal. No congestion. Eyes:      General: No scleral icterus. Conjunctiva/sclera: Conjunctivae normal.   Neck:      Musculoskeletal: Normal range of motion and neck supple. Cardiovascular:      Rate and Rhythm: Normal rate and regular rhythm. Pulses: Normal pulses. Heart sounds: Normal heart sounds. Pulmonary:      Effort: Pulmonary effort is normal. No respiratory distress. Breath sounds: Normal breath sounds. Abdominal:      Palpations: Abdomen is soft. Tenderness: There is no abdominal tenderness. Musculoskeletal: Normal range of motion. General: No tenderness. Skin:     General: Skin is warm and dry. Neurological:      Mental Status: She is alert and oriented to person, place, and time. Cranial Nerves: No cranial nerve deficit. Psychiatric:         Mood and Affect: Mood normal.         Behavior: Behavior normal.         Assessment:      1. Tinnitus of both ears    2.  Allergic rhinitis, unspecified seasonality, unspecified trigger            Plan:      BP Readings from Last 3 Encounters:   12/31/20 112/70   12/15/20 124/80   12/09/20 116/70     /70 (Site: Left Upper Arm, Position: Sitting, Cuff Size: Medium Adult)   Pulse 76   Temp 97.3 °F (36.3 °C) (Infrared)   Resp 16   Wt 127 lb 9.6 oz (57.9 kg)   BMI 22.78 kg/m²   Lab Results   Component Value Date    WBC 6.9 12/09/2020    HGB 13.8 12/09/2020    HCT 43.0 12/09/2020     12/09/2020    CHOL 174 08/27/2020    TRIG 103 08/27/2020    HDL 52 08/27/2020    ALT 14 12/09/2020    AST 25 12/09/2020     12/09/2020    K 4.4 12/09/2020     12/09/2020    CREATININE 0.69 12/09/2020    BUN 12 12/09/2020    CO2 24 12/09/2020    TSH 3.54 08/27/2020     Lab Results   Component Value Date    CALCIUM 9.6 12/09/2020     Lab Results   Component Value Date    LDLCHOLESTEROL 101 08/27/2020       1. Tinnitus of both ears  - start zyrtec  - Cetirizine HCl (ZYRTEC ALLERGY) 10 MG CAPS; Take one cap po daily  Dispense: 30 capsule; Refill: 2  - refer to ENT for further evaluation. 2. Allergic rhinitis, unspecified seasonality, unspecified trigger  - Cetirizine HCl (ZYRTEC ALLERGY) 10 MG CAPS; Take one cap po daily  Dispense: 30 capsule; Refill: 2      Requested Prescriptions     Signed Prescriptions Disp Refills    Cetirizine HCl (ZYRTEC ALLERGY) 10 MG CAPS 30 capsule 2     Sig: Take one cap po daily       Medications Discontinued During This Encounter   Medication Reason    Norethin Ace-Eth Estrad-FE (BLISOVI FE 1/20 PO) Therapy completed     Discussed use, benefit, and side effects of prescribed medications. Barriers to medication compliance addressed. All patient questions answered. Pt voiced understanding. No follow-ups on file.

## 2021-02-06 ENCOUNTER — HOSPITAL ENCOUNTER (OUTPATIENT)
Age: 31
Setting detail: SPECIMEN
Discharge: HOME | End: 2021-02-06
Payer: MEDICAID

## 2021-02-09 ENCOUNTER — HOSPITAL ENCOUNTER (OUTPATIENT)
Age: 31
Setting detail: SPECIMEN
Discharge: HOME | End: 2021-02-09
Payer: MEDICAID

## 2021-02-09 LAB
CHOLESTEROL, FASTING: 200 MG/DL
CHOLESTEROL/HDL RATIO: 3.3
ESTRADIOL LEVEL: 36 PG/ML (ref 27–314)
FOLLICLE STIMULATING HORMONE: 6.2 U/L (ref 1.7–21.5)
GLUCOSE FASTING: 87 MG/DL (ref 70–99)
HDLC SERPL-MCNC: 61 MG/DL
INSULIN COMMENT: NORMAL
INSULIN REFERENCE RANGE:: NORMAL
INSULIN: 7 MU/L
LDL CHOLESTEROL: 125 MG/DL (ref 0–130)
LH: 8.8 U/L (ref 1–95.6)
POTASSIUM SERPL-SCNC: 4.4 MMOL/L (ref 3.7–5.3)
PROLACTIN: 12.47 UG/L (ref 4.79–23.3)
T3 FREE: 3.93 PG/ML (ref 2.02–4.43)
TESTOSTERONE TOTAL: 35 NG/DL (ref 20–70)
THYROXINE, FREE: 1.4 NG/DL (ref 0.93–1.7)
TRIGLYCERIDE, FASTING: 69 MG/DL
TSH SERPL DL<=0.05 MIU/L-ACNC: 4.38 MIU/L (ref 0.3–5)
VLDLC SERPL CALC-MCNC: ABNORMAL MG/DL (ref 1–30)

## 2021-02-10 LAB
DHEAS (DHEA SULFATE): 305 UG/DL (ref 45–270)
ESTIMATED AVERAGE GLUCOSE: 103 MG/DL
HBA1C MFR BLD: 5.2 % (ref 4–6)
THYROID PEROXIDASE (TPO) AB: 31 IU/ML (ref 0–25)

## 2021-02-12 LAB — CORTISOL SALIVARY: 0.04 UG/DL

## 2021-02-15 LAB — 17 OH PROGESTERONE: 59.75 NG/DL

## 2021-03-15 ENCOUNTER — OFFICE VISIT (OUTPATIENT)
Dept: DERMATOLOGY | Age: 31
End: 2021-03-15
Payer: MEDICAID

## 2021-03-15 VITALS
BODY MASS INDEX: 22.64 KG/M2 | DIASTOLIC BLOOD PRESSURE: 59 MMHG | SYSTOLIC BLOOD PRESSURE: 97 MMHG | WEIGHT: 127.8 LBS | TEMPERATURE: 97.2 F | HEIGHT: 63 IN | HEART RATE: 80 BPM | OXYGEN SATURATION: 99 %

## 2021-03-15 DIAGNOSIS — L64.9 ANDROGENETIC ALOPECIA: ICD-10-CM

## 2021-03-15 DIAGNOSIS — L70.0 ACNE VULGARIS: ICD-10-CM

## 2021-03-15 PROCEDURE — 99214 OFFICE O/P EST MOD 30 MIN: CPT | Performed by: DERMATOLOGY

## 2021-03-15 PROCEDURE — G8420 CALC BMI NORM PARAMETERS: HCPCS | Performed by: DERMATOLOGY

## 2021-03-15 PROCEDURE — G8484 FLU IMMUNIZE NO ADMIN: HCPCS | Performed by: DERMATOLOGY

## 2021-03-15 PROCEDURE — 1036F TOBACCO NON-USER: CPT | Performed by: DERMATOLOGY

## 2021-03-15 PROCEDURE — G8427 DOCREV CUR MEDS BY ELIG CLIN: HCPCS | Performed by: DERMATOLOGY

## 2021-03-15 RX ORDER — SPIRONOLACTONE 50 MG/1
TABLET, FILM COATED ORAL
Qty: 30 TABLET | Refills: 2 | Status: SHIPPED
Start: 2021-03-15 | End: 2021-06-24 | Stop reason: ALTCHOICE

## 2021-03-15 NOTE — PATIENT INSTRUCTIONS
- Continue spironolactone  - Consider starting Rogaine  - Consider iRestore Laser Hair Growth System (not covered by insurance)  - Apply a non-comedogenic moisturizer prior to applying tretinoin cream if you are experiencing dryness (CeraVe, or Cetaphil)  - Vaseline to burn and keep covered  - Follow up in 3 months        How To Use Minoxidil (Rogaine)   Use mens strength: 5%    Once or twice/day  o Much better to use regularly once a day than inconsistently every other day    Only apply medication where you actually want your hair to grow  o Can cause hair to grow on face, neck, hands, etc. if not washed off  o Tips for application: wear gloves or wash hands with soap and water immediately      Important things to know:  o Need to use for ~8-12 months to see effects  o Medication must be used consistently and continuously for a sustained benefit   o May notice some hair loss when starting medication   - This is due to the medication converting the hair from a weaker hair into a stronger hair  o Will notice hair loss when medication is stopped   - Will lose only newly grown hair gained from using medicine  - Will NOT speed up hair loss, normal loss starts again     Cheapest place to buy Minoxidil is [facility]   o ~$50 = 6 month supply

## 2021-03-15 NOTE — PROGRESS NOTES
Topics    Alcohol use: Yes     Comment: rare       Pertinent ROS:  Review of Systems  Skin: Denies any new changing, growing or bleeding lesions or rashes except as described in the HPI   Constitutional: Denies fevers, chills, and malaise. PHYSICAL EXAM:   BP (!) 97/59 (Site: Left Upper Arm, Position: Sitting, Cuff Size: Medium Adult)   Pulse 80   Temp 97.2 °F (36.2 °C) (Temporal)   Ht 5' 2.75\" (1.594 m)   Wt 127 lb 12.8 oz (58 kg)   SpO2 99%   BMI 22.82 kg/m²     The patient is generally well appearing, well nourished, alert and conversational. Affect is normal.    Cutaneous Exam:  Physical Exam  Sun-exposed skin, which includes the head/face, neck, both arms, digits and/or nails was examined. Diagnoses/exam findings/medical history pertinent to this visit are listed below:    Assessment and Plan:  Assessment   1. Acne vulgaris  - chronic illness, responding to treatment but not yet at goal  - tretinoin (RETIN-A) 0.025 % cream; Apply pea sized amount to face nightly  Dispense: 45 g; Refill: 3  - spironolactone (ALDACTONE) 50 MG tablet; Take one tablet PO daily  Dispense: 30 tablet; Refill: 2    2. Androgenetic alopecia +/- telogen effluvium  - chronic illness, responding to treatment but not yet at goal   - still with notable decreased density  - discussed that TE can often unmask underlying AGA. - continue to f/u' with endocrine for any treatment of hashimotos should it be necessarily  - add topical minoxidil, as it is by far the most effective and safe treatment for AGA. In the future can consider LLLT, PRP, and oral minoxidil  - spironolactone (ALDACTONE) 50 MG tablet; Take one tablet PO daily  Dispense: 30 tablet;  Refill: 2          RTC 3 months    Patient Instructions   - Continue spironolactone  - Consider starting Rogaine  - Consider iRestore Laser Hair Growth System (not covered by insurance)  - Apply a non-comedogenic moisturizer prior to applying tretinoin cream if you are experiencing dryness (CeraVe, or Cetaphil)  - Vaseline to burn and keep covered  - Follow up in 3 months        How To Use Minoxidil (Rogaine)   Use mens strength: 5%    Once or twice/day  o Much better to use regularly once a day than inconsistently every other day    Only apply medication where you actually want your hair to grow  o Can cause hair to grow on face, neck, hands, etc. if not washed off  o Tips for application: wear gloves or wash hands with soap and water immediately      Important things to know:  o Need to use for ~8-12 months to see effects  o Medication must be used consistently and continuously for a sustained benefit   o May notice some hair loss when starting medication   - This is due to the medication converting the hair from a weaker hair into a stronger hair  o Will notice hair loss when medication is stopped   - Will lose only newly grown hair gained from using medicine  - Will NOT speed up hair loss, normal loss starts again     Cheapest place to buy Minoxidil is [facility]   o ~$50 = 6 month supply         This note was created with the assistance of a speech-recognition program.  Although the intention is to generate a document that actually reflects the content of the visit, no guarantees can be provided that every mistake has been identified and corrected byediting.     Electronically signed by Prince Rome MD on 3/15/21 at 11:22 AM PHILIP

## 2021-06-14 ENCOUNTER — HOSPITAL ENCOUNTER (OUTPATIENT)
Age: 31
Setting detail: SPECIMEN
Discharge: HOME | End: 2021-06-14
Payer: MEDICAID

## 2021-06-14 LAB
POTASSIUM SERPL-SCNC: 4.3 MMOL/L (ref 3.7–5.3)
T3 FREE: 3.11 PG/ML (ref 2.02–4.43)
THYROXINE, FREE: 1.27 NG/DL (ref 0.93–1.7)
TSH SERPL DL<=0.05 MIU/L-ACNC: 3.56 MIU/L (ref 0.3–5)

## 2021-06-17 ENCOUNTER — OFFICE VISIT (OUTPATIENT)
Dept: DERMATOLOGY | Age: 31
End: 2021-06-17
Payer: MEDICAID

## 2021-06-17 VITALS
HEIGHT: 63 IN | DIASTOLIC BLOOD PRESSURE: 67 MMHG | WEIGHT: 129 LBS | HEART RATE: 90 BPM | BODY MASS INDEX: 22.86 KG/M2 | SYSTOLIC BLOOD PRESSURE: 106 MMHG

## 2021-06-17 DIAGNOSIS — L65.9 ALOPECIA: Primary | ICD-10-CM

## 2021-06-17 DIAGNOSIS — L70.0 ACNE VULGARIS: ICD-10-CM

## 2021-06-17 PROCEDURE — 99213 OFFICE O/P EST LOW 20 MIN: CPT | Performed by: DERMATOLOGY

## 2021-06-17 PROCEDURE — 1036F TOBACCO NON-USER: CPT | Performed by: DERMATOLOGY

## 2021-06-17 PROCEDURE — G8420 CALC BMI NORM PARAMETERS: HCPCS | Performed by: DERMATOLOGY

## 2021-06-17 PROCEDURE — G8427 DOCREV CUR MEDS BY ELIG CLIN: HCPCS | Performed by: DERMATOLOGY

## 2021-06-17 NOTE — PATIENT INSTRUCTIONS
- Apply rogaine-0.5% daily (mens strength)  - stop spironolactone  - follow up in the office in 6 months

## 2021-06-17 NOTE — PROGRESS NOTES
Dermatology Patient Note  [facility]  [address]  [address]  Dept: [phone]  Dept Fax: [phone]      VISITDATE: 6/17/2021   REFERRING PROVIDER: No ref. provider found      Jammie Sood is a 26 y.o. female  who presents today in the office for:    3 Month Follow-Up (radha alopecia )      PERTINENT HISTORY NOT LISTED ABOVE:  Patient presents for f/u alopecia (androgenetic by biopsy. Anti-TPO ab with normal thyroid hormone, possible component of telogen effluvium)  - she still has not started rogaine  - purchased LLLT and using it 4 times weekly  - she stopped taking spironolactone, thinks it wasn't helping  - thinks things are about the same  - using tretinoin cream for acne    CURRENT MEDICATIONS:   Current Outpatient Medications   Medication Sig Dispense Refill    tretinoin (RETIN-A) 0.025 % cream Apply pea sized amount to face nightly 45 g 3    spironolactone (ALDACTONE) 50 MG tablet Take one tablet PO daily (Patient not taking: Reported on 6/17/2021) 30 tablet 2     No current facility-administered medications for this visit. ALLERGIES:   Allergies   Allergen Reactions    Benzoyl Peroxide     Benzyl Alcohol Swelling     Acne medication. SOCIAL HISTORY:  Social History     Tobacco Use    Smoking status: Never Smoker    Smokeless tobacco: Never Used   Substance Use Topics    Alcohol use: Yes     Comment: rare       Pertinent ROS:  Review of Systems  Skin: Denies any new changing, growing or bleeding lesions or rashes except as described in the HPI   Constitutional: Denies fevers, chills, and malaise.     PHYSICAL EXAM:   /67 (Site: Left Upper Arm, Position: Sitting, Cuff Size: Small Adult)   Pulse 90   Ht 5' 2.75\" (1.594 m)   Wt 129 lb (58.5 kg)   BMI 23.03 kg/m²     The patient is generally well appearing, well nourished, alert and conversational. Affect is normal.    Cutaneous Exam:  Physical Exam  Sun-exposed skin, which includes the head/face, neck, both arms, digits and/or nails was examined. Diagnoses/exam findings/medical history pertinent to this visit are listed below:    Assessment and Plan:  Assessment   1. Alopecia, androgenetic +/- telogen effluvium. Stable if not slightly improved  - chronic illness, responding to treatment but not yet at goal  - has been hesitant to start rogaine, but ready to do it now  - start Minoxidil 5% 1-2 daily. Counseled appropriate use. Will take up to 6 months to see results. May have initial telogen effluvium. Results will be reversed if use is discontinued. Switch to women's strength (2%) if increased facial hair growth noted. - patient states she has a [email or web address] membership no and will start rogaine  - continue LLLT    2. Acne vulgaris  - continue tretinoin          RTC 6 months    Patient Instructions   - Apply rogaine-0.5% daily (mens strength)  - stop spironolactone  - follow up in the office in 6 months      This note was created with the assistance of a speech-recognition program.  Although the intention is to generate a document that actually reflects the content of the visit, no guarantees can be provided that every mistake has been identified and corrected byediting.     Electronically signed by Dave Mckenzie MD on 6/17/21 at 1:50 PM EDT

## 2021-06-24 ENCOUNTER — OFFICE VISIT (OUTPATIENT)
Dept: FAMILY MEDICINE CLINIC | Age: 31
End: 2021-06-24
Payer: MEDICAID

## 2021-06-24 VITALS
SYSTOLIC BLOOD PRESSURE: 114 MMHG | HEART RATE: 62 BPM | RESPIRATION RATE: 16 BRPM | BODY MASS INDEX: 22.5 KG/M2 | DIASTOLIC BLOOD PRESSURE: 60 MMHG | TEMPERATURE: 98.2 F | WEIGHT: 126 LBS

## 2021-06-24 DIAGNOSIS — E87.6 HYPOKALEMIA: ICD-10-CM

## 2021-06-24 DIAGNOSIS — E55.9 VITAMIN D DEFICIENCY: Primary | ICD-10-CM

## 2021-06-24 PROCEDURE — G8420 CALC BMI NORM PARAMETERS: HCPCS | Performed by: FAMILY MEDICINE

## 2021-06-24 PROCEDURE — G8427 DOCREV CUR MEDS BY ELIG CLIN: HCPCS | Performed by: FAMILY MEDICINE

## 2021-06-24 PROCEDURE — 1036F TOBACCO NON-USER: CPT | Performed by: FAMILY MEDICINE

## 2021-06-24 PROCEDURE — 99213 OFFICE O/P EST LOW 20 MIN: CPT | Performed by: FAMILY MEDICINE

## 2021-06-24 SDOH — ECONOMIC STABILITY: FOOD INSECURITY: WITHIN THE PAST 12 MONTHS, THE FOOD YOU BOUGHT JUST DIDN'T LAST AND YOU DIDN'T HAVE MONEY TO GET MORE.: NEVER TRUE

## 2021-06-24 SDOH — ECONOMIC STABILITY: FOOD INSECURITY: WITHIN THE PAST 12 MONTHS, YOU WORRIED THAT YOUR FOOD WOULD RUN OUT BEFORE YOU GOT MONEY TO BUY MORE.: NEVER TRUE

## 2021-06-24 ASSESSMENT — ENCOUNTER SYMPTOMS
SHORTNESS OF BREATH: 0
BLOOD IN STOOL: 0
CHEST TIGHTNESS: 0
ABDOMINAL PAIN: 0

## 2021-06-24 ASSESSMENT — PATIENT HEALTH QUESTIONNAIRE - PHQ9
SUM OF ALL RESPONSES TO PHQ9 QUESTIONS 1 & 2: 0
1. LITTLE INTEREST OR PLEASURE IN DOING THINGS: 0
SUM OF ALL RESPONSES TO PHQ QUESTIONS 1-9: 0
SUM OF ALL RESPONSES TO PHQ QUESTIONS 1-9: 0
2. FEELING DOWN, DEPRESSED OR HOPELESS: 0
SUM OF ALL RESPONSES TO PHQ QUESTIONS 1-9: 0

## 2021-06-24 ASSESSMENT — SOCIAL DETERMINANTS OF HEALTH (SDOH): HOW HARD IS IT FOR YOU TO PAY FOR THE VERY BASICS LIKE FOOD, HOUSING, MEDICAL CARE, AND HEATING?: NOT HARD AT ALL

## 2021-06-24 NOTE — PROGRESS NOTES
Subjective:      Patient ID: Maryanne Carpio is a 26 y.o. female. Visit Information    Have you changed or started any medications since your last visit including any over-the-counter medicines, vitamins, or herbal medicines? no   Have you stopped taking any of your medications? Is so, why? -  yes - see updated med list   Are you having any side effects from any of your medications? - no    Have you seen any other physician or provider since your last visit? yes -ENT, DERM, ENDO    Have you had any other diagnostic tests since your last visit? yes -    Have you been seen in the emergency room and/or had an admission in a hospital since we last saw you?  no   Have you had your routine dental cleaning in the past 6 months?  no     Do you have an active MyChart account? If no, what is the barrier? Yes    Patient Care Team:  Eleanor Jerry MD as PCP - General (Family Medicine)  Eleanor Jerry MD [facility]  Kaushal Jones MD as Consulting Physician (Gastroenterology)    Medical History Review  Past Medical, Family, and Social History reviewed and does contribute to the patient presenting condition    Health Maintenance   Topic Date Due    COVID-19 Vaccine (1) Never done    HIV screen  Never done    Cervical cancer screen  Never done    Varicella vaccine (1 of 2 - 2-dose childhood series) 07/14/2021 (Originally 11/26/2009)    Flu vaccine (Season Ended) 09/01/2021    Creatinine monitoring  12/09/2021    Potassium monitoring  06/14/2022    DTaP/Tdap/Td vaccine (2 - Td or Tdap) 04/22/2024    Colon cancer screen colonoscopy  08/29/2028    Hepatitis C screen  Completed    Hepatitis A vaccine  Aged Out    Hepatitis B vaccine  Aged Out    Hib vaccine  Aged Out    Meningococcal (ACWY) vaccine  Aged Out    Pneumococcal 0-64 years Vaccine  Aged Out               HPI  Patient is a 68-year-old female who presents for vitamin D deficiency and hypokalemia.   Her most recent potassium level on 6/14/2021 was normal at 4.3. She denies any fever, chills, chest pain, abdominal pain, shortness of breath. She has a good appetite and remains active. Review of Systems   Constitutional: Negative for chills and fever. Respiratory: Negative for chest tightness and shortness of breath. Cardiovascular: Negative for chest pain. Gastrointestinal: Negative for abdominal pain and blood in stool. Genitourinary: Negative for dysuria and hematuria. Skin: Negative for rash. Objective:   Physical Exam  Vitals and nursing note reviewed. Constitutional:       General: She is not in acute distress. Appearance: She is well-developed. HENT:      Head: Normocephalic and atraumatic. Right Ear: Tympanic membrane, ear canal and external ear normal.      Left Ear: Tympanic membrane, ear canal and external ear normal.      Nose: Nose normal.      Mouth/Throat:      Mouth: Mucous membranes are moist.      Pharynx: Oropharynx is clear. Eyes:      General: No scleral icterus. Right eye: No discharge. Left eye: No discharge. Conjunctiva/sclera: Conjunctivae normal.   Cardiovascular:      Rate and Rhythm: Normal rate and regular rhythm. Heart sounds: Normal heart sounds. Pulmonary:      Effort: Pulmonary effort is normal. No respiratory distress. Breath sounds: Normal breath sounds. No wheezing. Abdominal:      General: There is no distension. Palpations: Abdomen is soft. Tenderness: There is no abdominal tenderness. Musculoskeletal:      Cervical back: Neck supple. Skin:     General: Skin is warm and dry. Findings: No rash. Neurological:      Mental Status: She is alert and oriented to person, place, and time. Psychiatric:         Mood and Affect: Mood normal.         Behavior: Behavior normal.         Assessment:       Diagnosis Orders   1. Vitamin D deficiency  Vitamin D 25 Hydroxy   2.  Hypokalemia             Plan:      Orders Placed This Encounter   Procedures    Vitamin D 25 Hydroxy     Standing Status:   Future     Standing Expiration Date:   6/24/2022         Continue routine medication    Follow-up in 6 months or sooner if needed

## 2021-07-02 ENCOUNTER — HOSPITAL ENCOUNTER (OUTPATIENT)
Age: 31
Setting detail: SPECIMEN
Discharge: HOME | End: 2021-07-02
Payer: MEDICAID

## 2021-07-02 DIAGNOSIS — E55.9 VITAMIN D DEFICIENCY: ICD-10-CM

## 2021-07-02 LAB — VITAMIN D 25-HYDROXY: 58.8 NG/ML (ref 30–100)

## 2021-07-04 LAB — THYROID PEROXIDASE (TPO) AB: 25 IU/ML (ref 0–25)

## 2021-09-09 ENCOUNTER — TELEPHONE (OUTPATIENT)
Dept: DERMATOLOGY | Age: 31
End: 2021-09-09

## 2021-09-09 NOTE — TELEPHONE ENCOUNTER
Spoke with patient. Relayed information to her. Wanting to pursue/look into PRP treatment. Information for [facility] given.

## 2021-09-09 NOTE — TELEPHONE ENCOUNTER
Some people experience temporary shedding when starting rogaine. This is because new hair is growing and pushing out the old. I still recommend that she continue to use it and commit to it, but I know she has been nervous about it since we started discussing it. Did she mean Viviscal? I'm not sure what she is using. The next options would be oral minoxidil and oral dutasteride. Both would need to be discussed in person. We can put her on cancellation for earlier appt. Beyond that, we've exhausted the options I can offer.  She could consider getting PRP at [facility] or other cosmetic dermatologist.

## 2021-09-09 NOTE — TELEPHONE ENCOUNTER
PT calling to let us know that the Rogaine started to make her hair fall out, so she stopped treatment. Noticed hair loss two weeks ago. Because of this patient decided to use an OTC solution called vivavicle twice a day  (From [employer]). PT continues to use the red light lamp twice a week. PT wants to know if there are any recommendations on what she should do? Do you want to see her before her next appointment or should she continue to use the Rogaine?

## 2021-09-30 ENCOUNTER — TELEPHONE (OUTPATIENT)
Dept: DERMATOLOGY | Age: 31
End: 2021-09-30

## 2021-09-30 NOTE — TELEPHONE ENCOUNTER
She is not using tretinoin, she got some products at [facility] and that is what is causing her irritation. Advised Per Dr Giancarlo Scott to hold and only apply bland products. Can try benadryl and monitor call next week if no improvement for a VV.

## 2021-09-30 NOTE — TELEPHONE ENCOUNTER
Patient says the cream that she is using on her face is causing swelling, puffiness, and itching on her face.

## 2021-09-30 NOTE — TELEPHONE ENCOUNTER
Does she mean the tretinoin cream? I first prescribed that back in March, so it would be unusual for it to cause redness and swelling all of the sudden. Did she get a lot of sun exposure? If she thinks the cream is causing the reaction, then go ahead and stop it. If she wants to retry it, apply to a focal area for a few days before spreading all over face.

## 2021-12-20 ENCOUNTER — OFFICE VISIT (OUTPATIENT)
Dept: DERMATOLOGY | Age: 31
End: 2021-12-20
Payer: MEDICAID

## 2021-12-20 VITALS
SYSTOLIC BLOOD PRESSURE: 116 MMHG | BODY MASS INDEX: 23.04 KG/M2 | DIASTOLIC BLOOD PRESSURE: 75 MMHG | HEIGHT: 63 IN | WEIGHT: 130 LBS | HEART RATE: 91 BPM

## 2021-12-20 DIAGNOSIS — L64.9 ANDROGENETIC ALOPECIA: Primary | ICD-10-CM

## 2021-12-20 DIAGNOSIS — L70.0 ACNE VULGARIS: ICD-10-CM

## 2021-12-20 PROCEDURE — G8427 DOCREV CUR MEDS BY ELIG CLIN: HCPCS | Performed by: DERMATOLOGY

## 2021-12-20 PROCEDURE — 1036F TOBACCO NON-USER: CPT | Performed by: DERMATOLOGY

## 2021-12-20 PROCEDURE — 99214 OFFICE O/P EST MOD 30 MIN: CPT | Performed by: DERMATOLOGY

## 2021-12-20 PROCEDURE — G8420 CALC BMI NORM PARAMETERS: HCPCS | Performed by: DERMATOLOGY

## 2021-12-20 PROCEDURE — G8484 FLU IMMUNIZE NO ADMIN: HCPCS | Performed by: DERMATOLOGY

## 2021-12-20 RX ORDER — SULFACETAMIDE SODIUM 100 MG/ML
LOTION TOPICAL
Qty: 118 ML | Refills: 3 | Status: SHIPPED | OUTPATIENT
Start: 2021-12-20 | End: 2022-03-24 | Stop reason: SDUPTHER

## 2021-12-21 ENCOUNTER — OFFICE VISIT (OUTPATIENT)
Dept: FAMILY MEDICINE CLINIC | Age: 31
End: 2021-12-21
Payer: MEDICAID

## 2021-12-21 ENCOUNTER — HOSPITAL ENCOUNTER (OUTPATIENT)
Age: 31
Setting detail: SPECIMEN
Discharge: HOME | End: 2021-12-21

## 2021-12-21 VITALS
WEIGHT: 130 LBS | TEMPERATURE: 97.5 F | DIASTOLIC BLOOD PRESSURE: 60 MMHG | SYSTOLIC BLOOD PRESSURE: 110 MMHG | HEART RATE: 60 BPM | BODY MASS INDEX: 23.03 KG/M2 | RESPIRATION RATE: 16 BRPM

## 2021-12-21 DIAGNOSIS — E55.9 VITAMIN D DEFICIENCY: Primary | ICD-10-CM

## 2021-12-21 DIAGNOSIS — E55.9 VITAMIN D DEFICIENCY: ICD-10-CM

## 2021-12-21 LAB — VITAMIN D 25-HYDROXY: 69 NG/ML (ref 30–100)

## 2021-12-21 PROCEDURE — 99213 OFFICE O/P EST LOW 20 MIN: CPT | Performed by: FAMILY MEDICINE

## 2021-12-21 PROCEDURE — G8484 FLU IMMUNIZE NO ADMIN: HCPCS | Performed by: FAMILY MEDICINE

## 2021-12-21 PROCEDURE — G8427 DOCREV CUR MEDS BY ELIG CLIN: HCPCS | Performed by: FAMILY MEDICINE

## 2021-12-21 PROCEDURE — G8420 CALC BMI NORM PARAMETERS: HCPCS | Performed by: FAMILY MEDICINE

## 2021-12-21 PROCEDURE — 1036F TOBACCO NON-USER: CPT | Performed by: FAMILY MEDICINE

## 2021-12-21 ASSESSMENT — ENCOUNTER SYMPTOMS
CHEST TIGHTNESS: 0
BLOOD IN STOOL: 0
ABDOMINAL PAIN: 0
SHORTNESS OF BREATH: 0

## 2021-12-21 NOTE — PROGRESS NOTES
Subjective:      Patient ID: Radha Long is a 33 y.o. female. Visit Information    Have you changed or started any medications since your last visit including any over-the-counter medicines, vitamins, or herbal medicines? no   Have you stopped taking any of your medications? Is so, why? -  no  Are you having any side effects from any of your medications? - no    Have you seen any other physician or provider since your last visit?  no   Have you had any other diagnostic tests since your last visit?  no   Have you been seen in the emergency room and/or had an admission in a hospital since we last saw you?  no   Have you had your routine dental cleaning in the past 6 months?  no     Do you have an active MyChart account? If no, what is the barrier? Yes    Patient Care Team:  Dorene Alejandre MD as PCP - General (Family Medicine)  Dorene Alejandre MD as PCP [facility]  Temo Paez MD as Consulting Physician (Gastroenterology)    Medical History Review  Past Medical, Family, and Social History reviewed and does contribute to the patient presenting condition    Health Maintenance   Topic Date Due    COVID-19 Vaccine (1) Never done    HIV screen  Never done    Varicella vaccine (1 of 2 - 2-dose childhood series) Never done    Cervical cancer screen  Never done    Flu vaccine (1) 09/01/2021    DTaP/Tdap/Td vaccine (2 - Td or Tdap) 04/22/2024    Colon cancer screen colonoscopy  08/29/2028    Hepatitis C screen  Completed    Hepatitis A vaccine  Aged Out    Hepatitis B vaccine  Aged Out    Hib vaccine  Aged Out    Meningococcal (ACWY) vaccine  Aged Out    Pneumococcal 0-64 years Vaccine  Aged Out               HPI  Patient is a 68-year-old white female who presents for vitamin D deficiency. She states she is taking and tolerating her routine medication. She states that she takes vitamin D daily but is not sure of the dose.   She denies any fever, chills, chest pain, abdominal pain, shortness of breath. She has a good appetite and remains active. Review of Systems   Constitutional: Negative for chills and fever. Respiratory: Negative for chest tightness and shortness of breath. Cardiovascular: Negative for chest pain. Gastrointestinal: Negative for abdominal pain and blood in stool. Genitourinary: Negative for dysuria and hematuria. Skin: Negative for rash. Objective:   Physical Exam  Vitals and nursing note reviewed. Constitutional:       General: She is not in acute distress. Appearance: She is well-developed. HENT:      Head: Normocephalic and atraumatic. Right Ear: Tympanic membrane, ear canal and external ear normal.      Left Ear: Tympanic membrane, ear canal and external ear normal.      Nose: Nose normal.      Mouth/Throat:      Mouth: Mucous membranes are moist.      Pharynx: Oropharynx is clear. Eyes:      General: No scleral icterus. Right eye: No discharge. Left eye: No discharge. Conjunctiva/sclera: Conjunctivae normal.   Cardiovascular:      Rate and Rhythm: Normal rate and regular rhythm. Heart sounds: Normal heart sounds. Pulmonary:      Effort: Pulmonary effort is normal. No respiratory distress. Breath sounds: Normal breath sounds. No wheezing. Abdominal:      General: There is no distension. Palpations: Abdomen is soft. Tenderness: There is no abdominal tenderness. Musculoskeletal:      Cervical back: Neck supple. Skin:     General: Skin is warm and dry. Findings: No rash. Neurological:      Mental Status: She is alert and oriented to person, place, and time. Psychiatric:         Mood and Affect: Mood normal.         Behavior: Behavior normal.         Assessment:       Diagnosis Orders   1.  Vitamin D deficiency  Vitamin D 25 Hydroxy           Plan:      Orders Placed This Encounter   Procedures    Vitamin D 25 Hydroxy     Standing Status:   Future     Standing Expiration Date: 12/21/2022         Continue routine medication  Follow-up in 6 months or sooner if needed

## 2021-12-22 LAB
T3 FREE: 3.62 PG/ML (ref 2.02–4.43)
THYROXINE, FREE: 1.22 NG/DL (ref 0.93–1.7)
TSH SERPL DL<=0.05 MIU/L-ACNC: 3.63 MIU/L (ref 0.3–5)

## 2022-03-24 ENCOUNTER — OFFICE VISIT (OUTPATIENT)
Dept: DERMATOLOGY | Age: 32
End: 2022-03-24
Payer: MEDICAID

## 2022-03-24 VITALS
SYSTOLIC BLOOD PRESSURE: 110 MMHG | HEART RATE: 61 BPM | DIASTOLIC BLOOD PRESSURE: 78 MMHG | TEMPERATURE: 97.3 F | WEIGHT: 137 LBS | OXYGEN SATURATION: 99 % | HEIGHT: 62 IN | BODY MASS INDEX: 25.21 KG/M2

## 2022-03-24 DIAGNOSIS — L70.0 ACNE VULGARIS: Primary | ICD-10-CM

## 2022-03-24 PROCEDURE — G8427 DOCREV CUR MEDS BY ELIG CLIN: HCPCS | Performed by: DERMATOLOGY

## 2022-03-24 PROCEDURE — G8419 CALC BMI OUT NRM PARAM NOF/U: HCPCS | Performed by: DERMATOLOGY

## 2022-03-24 PROCEDURE — 99213 OFFICE O/P EST LOW 20 MIN: CPT | Performed by: DERMATOLOGY

## 2022-03-24 PROCEDURE — G8484 FLU IMMUNIZE NO ADMIN: HCPCS | Performed by: DERMATOLOGY

## 2022-03-24 PROCEDURE — 1036F TOBACCO NON-USER: CPT | Performed by: DERMATOLOGY

## 2022-03-24 RX ORDER — SULFACETAMIDE SODIUM 100 MG/ML
LOTION TOPICAL
Qty: 118 ML | Refills: 11 | Status: SHIPPED | OUTPATIENT
Start: 2022-03-24

## 2022-03-24 RX ORDER — IBUPROFEN 800 MG/1
TABLET ORAL
COMMUNITY
Start: 2022-03-04 | End: 2022-04-22 | Stop reason: CLARIF

## 2022-03-24 RX ORDER — LORATADINE 10 MG/1
TABLET ORAL
COMMUNITY
Start: 2022-02-18

## 2022-03-24 NOTE — PROGRESS NOTES
Dermatology Patient Note  [address] [facility] [address]  Dept: [phone]  Dept Fax: [phone]      VISITDATE: 3/24/2022   REFERRING PROVIDER: No ref. provider found      Vannessa Guzman is a 33 y.o. female  who presents today in the office for:    Acne (pt feels like acne is better)      HISTORY OF PRESENT ILLNESS:  As above. Patient is happy with results. She hasn't been consistent with the topicals she was prescribed. 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    Acne vulgaris 06/24/2021    Hashimoto's thyroiditis 06/24/2021    Vitamin D deficiency 08/25/2020    Hypokalemia 08/25/2020    Rectal pain 06/13/2018    Abdominal pain 06/13/2018    Abdominal bloating 06/13/2018    Nausea 06/13/2018    Rectal bleeding 05/22/2015    Gall stones 05/22/2015       CURRENT MEDICATIONS:   Current Outpatient Medications   Medication Sig Dispense Refill    Desogestrel-Ethinyl Estradiol (VOLNEA PO) Take by mouth daily      Sulfacetamide Sodium, Acne, 10 % LOTN Apply to face daily 118 mL 3    tretinoin (RETIN-A) 0.025 % cream Apply pea sized amount to face nightly 45 g 3    ibuprofen (ADVIL;MOTRIN) 800 MG tablet       loratadine (CLARITIN) 10 MG tablet        No current facility-administered medications for this visit. ALLERGIES:   Allergies   Allergen Reactions    Benzoyl Peroxide     Benzyl Alcohol Swelling     Acne medication. SOCIAL HISTORY:  Social History     Tobacco Use    Smoking status: Never Smoker    Smokeless tobacco: Never Used   Substance Use Topics    Alcohol use: Yes     Comment: rare       Pertinent ROS:  Review of Systems  Skin: Denies any new changing, growing or bleeding lesions or rashes except as described in the HPI   Constitutional: Denies fevers, chills, and malaise.     PHYSICAL EXAM:   /78 (Site: Left Upper Arm, Position: Sitting, Cuff Size: Medium Adult)   Pulse 61   Temp 97.3 °F (36.3 °C) (Temporal)   Ht 5' 2\" (1.575 m)   Wt 137 lb (62.1 kg)   LMP  (LMP Unknown)   SpO2 99%   BMI 25.06 kg/m²     The patient is generally well appearing, well nourished, alert and conversational. Affect is normal.    Cutaneous Exam:  Physical Exam  Focused exam of face was performed    Facial covering was removed during examination. Diagnoses/exam findings/medical history pertinent to this visit are listed below:    Assessment:   Diagnosis Orders   1. Acne vulgaris          Plan:  Acne Vulgaris   - chronic illness, responding to treatment but not yet at goal  - try to be more consistent  - continue using sulfacetamide lotion  - continue using tretinoin cream nightly     Androgenetic alopecia - does not desire treatment    RTC 1 year     Future Appointments   Date Time Provider Bailey Paige   6/2/2022  2:00 PM Yocasta Dan MD [facility]         Patient Instructions   - continue using sulfacetamide lotion daily   - continue using tretinoin cream nightly         I, Yara Thomas, personally scribed the services dictated to me by Dr. Suze Alarcon in this documentation. I, Dr. Suze Alarcon, personally performed the services described in this documentation, as scribed by Juliette Ornelas in my presence, and it is both accurate and complete.     Electronically signed by Monisha Bal MD on 3/24/2022 at 2:14 PM

## 2022-04-14 ENCOUNTER — HOSPITAL ENCOUNTER (OUTPATIENT)
Dept: ULTRASOUND IMAGING | Age: 32
Discharge: HOME | End: 2022-04-16
Payer: MEDICAID

## 2022-04-14 DIAGNOSIS — E06.3 HASHIMOTO'S THYROIDITIS: ICD-10-CM

## 2022-04-14 DIAGNOSIS — R13.10 DYSPHAGIA, UNSPECIFIED TYPE: ICD-10-CM

## 2022-04-14 PROCEDURE — 76536 US EXAM OF HEAD AND NECK: CPT

## 2022-04-22 ENCOUNTER — HOSPITAL ENCOUNTER (OUTPATIENT)
Age: 32
Discharge: HOME | End: 2022-04-22
Payer: MEDICAID

## 2022-04-22 ENCOUNTER — OFFICE VISIT (OUTPATIENT)
Dept: FAMILY MEDICINE CLINIC | Age: 32
End: 2022-04-22
Payer: MEDICAID

## 2022-04-22 VITALS
HEIGHT: 62 IN | WEIGHT: 136.2 LBS | BODY MASS INDEX: 25.06 KG/M2 | HEART RATE: 87 BPM | DIASTOLIC BLOOD PRESSURE: 68 MMHG | SYSTOLIC BLOOD PRESSURE: 110 MMHG | OXYGEN SATURATION: 99 % | TEMPERATURE: 97.7 F

## 2022-04-22 DIAGNOSIS — R19.7 DIARRHEA, UNSPECIFIED TYPE: ICD-10-CM

## 2022-04-22 DIAGNOSIS — R59.9 SWOLLEN GLAND: ICD-10-CM

## 2022-04-22 DIAGNOSIS — E04.1 THYROID NODULE: Primary | ICD-10-CM

## 2022-04-22 LAB
ALBUMIN SERPL-MCNC: 4.2 G/DL (ref 3.5–5.2)
ALBUMIN/GLOBULIN RATIO: 1.6 (ref 1–2.5)
ALP BLD-CCNC: 51 U/L (ref 35–104)
ALT SERPL-CCNC: 13 U/L (ref 5–33)
ANION GAP SERPL CALCULATED.3IONS-SCNC: 10 MMOL/L (ref 9–17)
AST SERPL-CCNC: 19 U/L
BILIRUB SERPL-MCNC: 0.29 MG/DL (ref 0.3–1.2)
BUN BLDV-MCNC: 10 MG/DL (ref 6–20)
CALCIUM SERPL-MCNC: 9.2 MG/DL (ref 8.6–10.4)
CHLORIDE BLD-SCNC: 107 MMOL/L (ref 98–107)
CO2: 22 MMOL/L (ref 20–31)
CREAT SERPL-MCNC: 0.67 MG/DL (ref 0.5–0.9)
GFR AFRICAN AMERICAN: >60 ML/MIN
GFR NON-AFRICAN AMERICAN: >60 ML/MIN
GFR SERPL CREATININE-BSD FRML MDRD: ABNORMAL ML/MIN/{1.73_M2}
GLUCOSE BLD-MCNC: 87 MG/DL (ref 70–99)
HCT VFR BLD CALC: 38.8 % (ref 36.3–47.1)
HEMOGLOBIN: 13.1 G/DL (ref 11.9–15.1)
MCH RBC QN AUTO: 29.4 PG (ref 25.2–33.5)
MCHC RBC AUTO-ENTMCNC: 33.8 G/DL (ref 28.4–34.8)
MCV RBC AUTO: 87 FL (ref 82.6–102.9)
NRBC AUTOMATED: 0 PER 100 WBC
PDW BLD-RTO: 12.5 % (ref 11.8–14.4)
PLATELET # BLD: 270 K/UL (ref 138–453)
PMV BLD AUTO: 8.6 FL (ref 8.1–13.5)
POTASSIUM SERPL-SCNC: 4.4 MMOL/L (ref 3.7–5.3)
RBC # BLD: 4.46 M/UL (ref 3.95–5.11)
SODIUM BLD-SCNC: 139 MMOL/L (ref 135–144)
TOTAL PROTEIN: 6.9 G/DL (ref 6.4–8.3)
WBC # BLD: 8.7 K/UL (ref 3.5–11.3)

## 2022-04-22 PROCEDURE — 80053 COMPREHEN METABOLIC PANEL: CPT

## 2022-04-22 PROCEDURE — 99214 OFFICE O/P EST MOD 30 MIN: CPT | Performed by: NURSE PRACTITIONER

## 2022-04-22 PROCEDURE — 85027 COMPLETE CBC AUTOMATED: CPT

## 2022-04-22 PROCEDURE — G8420 CALC BMI NORM PARAMETERS: HCPCS | Performed by: NURSE PRACTITIONER

## 2022-04-22 PROCEDURE — G8427 DOCREV CUR MEDS BY ELIG CLIN: HCPCS | Performed by: NURSE PRACTITIONER

## 2022-04-22 PROCEDURE — 36415 COLL VENOUS BLD VENIPUNCTURE: CPT

## 2022-04-22 PROCEDURE — 1036F TOBACCO NON-USER: CPT | Performed by: NURSE PRACTITIONER

## 2022-04-22 ASSESSMENT — ENCOUNTER SYMPTOMS
SHORTNESS OF BREATH: 0
TROUBLE SWALLOWING: 1
WHEEZING: 0
VOMITING: 0
DIARRHEA: 1
ABDOMINAL PAIN: 0
NAUSEA: 0

## 2022-04-22 NOTE — PROGRESS NOTES
Subjective:      Patient ID: Boni Lin is a 33 y.o. female. HPI  Visit Information    Have you changed or started any medications since your last visit including any over-the-counter medicines, vitamins, or herbal medicines? no   Are you having any side effects from any of your medications? -  no  Have you stopped taking any of your medications? Is so, why? -  no    Have you seen any other physician or provider since your last visit? Yes - Records Requested  Have you had any other diagnostic tests since your last visit? Yes   Have you been seen in the emergency room and/or had an admission to a hospital since we last saw you? No  Have you had your routine dental cleaning in the past 6 months? yes -     Have you activated your [employer] account? If not, what are your barriers? Yes     Patient Care Team:  Raina Fowler MD as PCP - General (Family Medicine)  Rain[facility] Fowler [facility]  Juvenal Hui MD as Consulting Physician (Gastroenterology)    Medical History Review  Past Medical, Family, and Social History reviewed and does not contribute to the patient presenting condition    Health Maintenance   Topic Date Due    COVID-19 Vaccine (1) Never done    HIV screen  Never done    Varicella vaccine (1 of 2 - 2-dose childhood series) Never done    Cervical cancer screen  Never done    Depression Screen  06/24/2022    Flu vaccine (Season Ended) 09/01/2022    DTaP/Tdap/Td vaccine (3 - Td or Tdap) 02/19/2032    Colorectal Cancer Screen  08/21/2033    Hepatitis C screen  Completed    Hepatitis A vaccine  Aged Out    Hepatitis B vaccine  Aged Out    Hib vaccine  Aged Out    Meningococcal (ACWY) vaccine  Aged Out    Pneumococcal 0-64 years Vaccine  Aged Out       33year old female presents with swollen lymph nodes left thyroid nodule dysphagia and diarrhea.  States she noticed a swollen lymph node in left side of neck a couple of weeks ago,  which is slightly tender to palpation and makes it hard for her to swallow. Pt had ultrasound neck a week ago which showed   Left thyroid nodule and is scheduled for biopsy. Also c/o diarrhea for 2 days and states she has loose stool a couple of times a day, denies fever chills nausea vomiting or abd pain. States she still has good appetite. .   States she has been eating a lot of salads lately. Nonsmoker or drinker. Review of Systems   Constitutional: Negative for chills and fever. HENT: Positive for trouble swallowing. Swollen gland left side of neck   Respiratory: Negative for shortness of breath and wheezing. Cardiovascular: Negative for chest pain and leg swelling. Gastrointestinal: Positive for diarrhea. Negative for abdominal pain, nausea and vomiting. Neurological: Negative for dizziness, weakness and numbness. Psychiatric/Behavioral: Negative for agitation and behavioral problems. Objective:   Physical Exam  Vitals and nursing note reviewed. Constitutional:       General: She is not in acute distress. Appearance: Normal appearance. HENT:      Nose: Nose normal.   Eyes:      Conjunctiva/sclera: Conjunctivae normal.   Cardiovascular:      Rate and Rhythm: Normal rate and regular rhythm. Heart sounds: Normal heart sounds. Pulmonary:      Effort: Pulmonary effort is normal. No respiratory distress. Breath sounds: Normal breath sounds. Abdominal:      Palpations: Abdomen is soft. Tenderness: There is no abdominal tenderness. There is no guarding. Musculoskeletal:         General: Normal range of motion. Cervical back: Neck supple. Tenderness (in left side of neck ) present. Lymphadenopathy:      Cervical: Cervical adenopathy present. Skin:     General: Skin is warm and dry. Neurological:      Mental Status: She is alert and oriented to person, place, and time. Cranial Nerves: No cranial nerve deficit.    Psychiatric:         Mood and Affect: Mood normal. Behavior: Behavior normal.         Assessment:      1. Thyroid nodule    2. Swollen gland    3. Diarrhea, unspecified type            Plan:      BP Readings from Last 3 Encounters:   04/22/22 110/68   03/24/22 110/78   12/21/21 110/60     /68   Pulse 87   Temp 97.7 °F (36.5 °C)   Ht 5' 2\" (1.575 m)   Wt 136 lb 3.2 oz (61.8 kg)   LMP  (LMP Unknown)   SpO2 99%   BMI 24.91 kg/m²   Lab Results   Component Value Date    WBC 6.9 12/09/2020    HGB 13.8 12/09/2020    HCT 43.0 12/09/2020     12/09/2020    CHOL 174 08/27/2020    TRIG 103 08/27/2020    HDL 61 02/09/2021    ALT 14 12/09/2020    AST 25 12/09/2020     12/09/2020    K 4.3 06/14/2021     12/09/2020    CREATININE 0.69 12/09/2020    BUN 12 12/09/2020    CO2 24 12/09/2020    TSH 3.63 12/21/2021    GLUF 87 02/09/2021    LABA1C 5.2 02/09/2021     Lab Results   Component Value Date    CALCIUM 9.6 12/09/2020     Lab Results   Component Value Date    LDLCHOLESTEROL 125 02/09/2021         1. Thyroid nodule 2. Swollen gland  - follow up as scheduled for biopsy     3. Diarrhea, unspecified type   - CBC; Future  - Comprehensive Metabolic Panel; Future  - Gastrointestinal Panel, Molecular; Future  - advised pedilytes and gatorade and obtain stool culture if loose stool persists. Requested Prescriptions      No prescriptions requested or ordered in this encounter       Medications Discontinued During This Encounter   Medication Reason    ibuprofen (ADVIL;MOTRIN) 800 MG tablet ERROR      All patient questions answered. Pt voiced understanding. No follow-ups on file.

## 2022-05-07 ENCOUNTER — HOSPITAL ENCOUNTER (OUTPATIENT)
Age: 32
Discharge: HOME | End: 2022-05-07
Payer: MEDICAID

## 2022-05-07 DIAGNOSIS — R19.7 DIARRHEA, UNSPECIFIED TYPE: ICD-10-CM

## 2022-05-07 PROCEDURE — 87506 IADNA-DNA/RNA PROBE TQ 6-11: CPT

## 2022-05-08 LAB
CAMPYLOBACTER PCR: NORMAL
E COLI ENTEROTOXIGENIC PCR: NORMAL
PLESIOMONAS SHIGELLOIDES PCR: NORMAL
SALMONELLA PCR: NORMAL
SHIGATOXIN GENE PCR: NORMAL
SHIGELLA SP PCR: NORMAL
SPECIMEN DESCRIPTION: NORMAL
VIBRIO PCR: NORMAL
YERSINIA ENTEROCOLITICA PCR: NORMAL

## 2022-06-08 DIAGNOSIS — R59.9 SWOLLEN GLAND: ICD-10-CM

## 2022-06-08 DIAGNOSIS — E04.1 THYROID NODULE: Primary | ICD-10-CM

## 2022-06-14 ENCOUNTER — OFFICE VISIT (OUTPATIENT)
Dept: FAMILY MEDICINE CLINIC | Age: 32
End: 2022-06-14
Payer: MEDICAID

## 2022-06-14 VITALS
TEMPERATURE: 97.7 F | SYSTOLIC BLOOD PRESSURE: 96 MMHG | DIASTOLIC BLOOD PRESSURE: 60 MMHG | HEART RATE: 72 BPM | BODY MASS INDEX: 25.24 KG/M2 | RESPIRATION RATE: 14 BRPM | WEIGHT: 138 LBS

## 2022-06-14 DIAGNOSIS — E06.3 HASHIMOTO'S THYROIDITIS: Primary | ICD-10-CM

## 2022-06-14 DIAGNOSIS — E04.1 LEFT THYROID NODULE: ICD-10-CM

## 2022-06-14 PROCEDURE — G8427 DOCREV CUR MEDS BY ELIG CLIN: HCPCS | Performed by: FAMILY MEDICINE

## 2022-06-14 PROCEDURE — 1036F TOBACCO NON-USER: CPT | Performed by: FAMILY MEDICINE

## 2022-06-14 PROCEDURE — G8419 CALC BMI OUT NRM PARAM NOF/U: HCPCS | Performed by: FAMILY MEDICINE

## 2022-06-14 PROCEDURE — 99213 OFFICE O/P EST LOW 20 MIN: CPT | Performed by: FAMILY MEDICINE

## 2022-06-14 RX ORDER — GLYCERIN/PROPYLENE GLYCOL 0.6 %-0.6%
DROPPERETTE, SINGLE-USE DROP DISPENSER OPHTHALMIC (EYE)
COMMUNITY
Start: 2022-05-11

## 2022-06-14 SDOH — ECONOMIC STABILITY: FOOD INSECURITY: WITHIN THE PAST 12 MONTHS, THE FOOD YOU BOUGHT JUST DIDN'T LAST AND YOU DIDN'T HAVE MONEY TO GET MORE.: NEVER TRUE

## 2022-06-14 SDOH — ECONOMIC STABILITY: FOOD INSECURITY: WITHIN THE PAST 12 MONTHS, YOU WORRIED THAT YOUR FOOD WOULD RUN OUT BEFORE YOU GOT MONEY TO BUY MORE.: NEVER TRUE

## 2022-06-14 ASSESSMENT — ENCOUNTER SYMPTOMS
BLOOD IN STOOL: 0
CHEST TIGHTNESS: 0
SHORTNESS OF BREATH: 0
ABDOMINAL PAIN: 0

## 2022-06-14 ASSESSMENT — PATIENT HEALTH QUESTIONNAIRE - PHQ9
1. LITTLE INTEREST OR PLEASURE IN DOING THINGS: 0
SUM OF ALL RESPONSES TO PHQ9 QUESTIONS 1 & 2: 0
SUM OF ALL RESPONSES TO PHQ QUESTIONS 1-9: 0
2. FEELING DOWN, DEPRESSED OR HOPELESS: 0
SUM OF ALL RESPONSES TO PHQ QUESTIONS 1-9: 0

## 2022-06-14 ASSESSMENT — SOCIAL DETERMINANTS OF HEALTH (SDOH): HOW HARD IS IT FOR YOU TO PAY FOR THE VERY BASICS LIKE FOOD, HOUSING, MEDICAL CARE, AND HEATING?: NOT HARD AT ALL

## 2022-06-14 ASSESSMENT — LIFESTYLE VARIABLES: HOW OFTEN DO YOU HAVE A DRINK CONTAINING ALCOHOL: NEVER

## 2022-06-14 NOTE — PROGRESS NOTES
Subjective:      Patient ID: Severiano Ards is a 33 y.o. female. HPI  Visit Information    Have you changed or started any medications since your last visit including any over-the-counter medicines, vitamins, or herbal medicines? yes - sooth eye drops   Are you having any side effects from any of your medications? -  no  Have you stopped taking any of your medications? Is so, why? -  no    Have you seen any other physician or provider since your last visit? Yes-chiropractor      Have you had any other diagnostic tests since your last visit? Yes - Records Obtained  Have you been seen in the emergency room and/or had an admission to a hospital since we last saw you? No  Have you had your routine dental cleaning in the past 6 months? yes    Have you activated your [employer] account? If not, what are your barriers? Yes     Patient Care Team:  Erasmo Glaser MD as PCP - General (Family Medicine)  Erasmo Glaser MD as P[facility]  Neville Dupont MD as Consulting Physician (Gastroenterology)    Medical History Review  Past Medical, Family, and Social History reviewed and does contribute to the patient presenting condition    Health Maintenance   Topic Date Due    COVID-19 Vaccine (1) Never done    HIV screen  Never done    Varicella vaccine (1 of 2 - 2-dose childhood series) Never done    Cervical cancer screen  Never done    Depression Screen  06/24/2022    Flu vaccine (Season Ended) 09/01/2022    DTaP/Tdap/Td vaccine (3 - Td or Tdap) 02/19/2032    Colorectal Cancer Screen  08/21/2033    Hepatitis C screen  Completed    Hepatitis A vaccine  Aged Out    Hepatitis B vaccine  Aged Out    Hib vaccine  Aged Out    Meningococcal (ACWY) vaccine  Aged Out    Pneumococcal 0-64 years Vaccine  Aged Out   Patient is a  78-year-old white female who presents with with Hashimoto's thyroiditis and left thyroid nodule.   She states that her endocrinologist has referred her to a surgeon for possible thyroidectomy. She has an appointment with the surgeon on 6/29/2022. She denies any fever, chills, chest pain, abdominal pain, shortness of breath. She has a good appetite and remains active. Review of Systems   Constitutional: Negative for chills and fever. Respiratory: Negative for chest tightness and shortness of breath. Cardiovascular: Negative for chest pain. Gastrointestinal: Negative for abdominal pain and blood in stool. Skin: Negative for rash. Objective:   Physical Exam  Vitals and nursing note reviewed. Constitutional:       General: She is not in acute distress. Appearance: She is well-developed. HENT:      Head: Normocephalic and atraumatic. Right Ear: Tympanic membrane, ear canal and external ear normal.      Left Ear: Tympanic membrane, ear canal and external ear normal.      Nose: Nose normal.      Mouth/Throat:      Mouth: Mucous membranes are moist.      Pharynx: Oropharynx is clear. Eyes:      General: No scleral icterus. Right eye: No discharge. Left eye: No discharge. Conjunctiva/sclera: Conjunctivae normal.   Cardiovascular:      Rate and Rhythm: Normal rate and regular rhythm. Heart sounds: Normal heart sounds. Pulmonary:      Effort: Pulmonary effort is normal. No respiratory distress. Breath sounds: Normal breath sounds. No wheezing. Abdominal:      General: There is no distension. Palpations: Abdomen is soft. Tenderness: There is no abdominal tenderness. Musculoskeletal:      Cervical back: Neck supple. Skin:     General: Skin is warm and dry. Findings: No rash. Neurological:      Mental Status: She is alert and oriented to person, place, and time. Psychiatric:         Mood and Affect: Mood normal.         Behavior: Behavior normal.         Assessment:       Diagnosis Orders   1. Hashimoto's thyroiditis     2.  Left thyroid nodule             Plan:            Continue routine medication  Follow-up with her endocrinologist as scheduled  Follow-up in 5 to 6 months

## 2022-07-05 ENCOUNTER — TELEPHONE (OUTPATIENT)
Dept: FAMILY MEDICINE CLINIC | Age: 32
End: 2022-07-05

## 2022-07-05 DIAGNOSIS — Z77.011 LEAD EXPOSURE: Primary | ICD-10-CM

## 2022-07-05 NOTE — TELEPHONE ENCOUNTER
The patient's house recently tested positive for lead and she has been living there for the past 3 years. She wondered if she should have any testing done. Please advise.

## 2022-07-06 ENCOUNTER — HOSPITAL ENCOUNTER (OUTPATIENT)
Age: 32
Discharge: HOME | End: 2022-07-06
Payer: MEDICAID

## 2022-07-06 DIAGNOSIS — Z77.011 LEAD EXPOSURE: ICD-10-CM

## 2022-07-06 PROCEDURE — 83655 ASSAY OF LEAD: CPT

## 2022-07-06 PROCEDURE — 36415 COLL VENOUS BLD VENIPUNCTURE: CPT

## 2022-07-07 LAB — LEAD BLOOD: 1 UG/DL (ref 0–4)

## 2022-07-08 ENCOUNTER — TELEPHONE (OUTPATIENT)
Dept: FAMILY MEDICINE CLINIC | Age: 32
End: 2022-07-08

## 2022-07-09 ENCOUNTER — HOSPITAL ENCOUNTER (OUTPATIENT)
Age: 32
Discharge: HOME | End: 2022-07-09
Payer: MEDICAID

## 2022-07-09 LAB
ABSOLUTE EOS #: 0.33 K/UL (ref 0–0.44)
ABSOLUTE IMMATURE GRANULOCYTE: 0.03 K/UL (ref 0–0.3)
ABSOLUTE LYMPH #: 2.15 K/UL (ref 1.1–3.7)
ABSOLUTE MONO #: 0.32 K/UL (ref 0.1–1.2)
ALBUMIN SERPL-MCNC: 4.7 G/DL (ref 3.5–5.2)
ALBUMIN/GLOBULIN RATIO: 1.7 (ref 1–2.5)
ALP BLD-CCNC: 62 U/L (ref 35–104)
ALT SERPL-CCNC: 12 U/L (ref 5–33)
ANION GAP SERPL CALCULATED.3IONS-SCNC: 11 MMOL/L (ref 9–17)
AST SERPL-CCNC: 20 U/L
BASOPHILS # BLD: 0 % (ref 0–2)
BASOPHILS ABSOLUTE: 0.03 K/UL (ref 0–0.2)
BILIRUB SERPL-MCNC: 0.49 MG/DL (ref 0.3–1.2)
BUN BLDV-MCNC: 9 MG/DL (ref 6–20)
CALCIUM SERPL-MCNC: 9.6 MG/DL (ref 8.6–10.4)
CHLORIDE BLD-SCNC: 103 MMOL/L (ref 98–107)
CO2: 23 MMOL/L (ref 20–31)
CREAT SERPL-MCNC: 0.7 MG/DL (ref 0.5–0.9)
EOSINOPHILS RELATIVE PERCENT: 4 % (ref 1–4)
GFR AFRICAN AMERICAN: >60 ML/MIN
GFR NON-AFRICAN AMERICAN: >60 ML/MIN
GFR SERPL CREATININE-BSD FRML MDRD: NORMAL ML/MIN/{1.73_M2}
GLUCOSE BLD-MCNC: 88 MG/DL (ref 70–99)
HCT VFR BLD CALC: 41.1 % (ref 36.3–47.1)
HEMOGLOBIN: 13.5 G/DL (ref 11.9–15.1)
IMMATURE GRANULOCYTES: 0 %
LYMPHOCYTES # BLD: 28 % (ref 24–43)
MAGNESIUM: 1.9 MG/DL (ref 1.6–2.6)
MCH RBC QN AUTO: 28.8 PG (ref 25.2–33.5)
MCHC RBC AUTO-ENTMCNC: 32.8 G/DL (ref 28.4–34.8)
MCV RBC AUTO: 87.6 FL (ref 82.6–102.9)
MONOCYTES # BLD: 4 % (ref 3–12)
NRBC AUTOMATED: 0 PER 100 WBC
PDW BLD-RTO: 12.8 % (ref 11.8–14.4)
PLATELET # BLD: 313 K/UL (ref 138–453)
PMV BLD AUTO: 8.5 FL (ref 8.1–13.5)
POTASSIUM SERPL-SCNC: 4.3 MMOL/L (ref 3.7–5.3)
RBC # BLD: 4.69 M/UL (ref 3.95–5.11)
SEG NEUTROPHILS: 64 % (ref 36–65)
SEGMENTED NEUTROPHILS ABSOLUTE COUNT: 4.85 K/UL (ref 1.5–8.1)
SODIUM BLD-SCNC: 137 MMOL/L (ref 135–144)
TOTAL PROTEIN: 7.5 G/DL (ref 6.4–8.3)
VITAMIN B-12: 986 PG/ML (ref 232–1245)
VITAMIN D 25-HYDROXY: 94 NG/ML
WBC # BLD: 7.7 K/UL (ref 3.5–11.3)

## 2022-07-09 PROCEDURE — 84630 ASSAY OF ZINC: CPT

## 2022-07-09 PROCEDURE — 85025 COMPLETE CBC W/AUTO DIFF WBC: CPT

## 2022-07-09 PROCEDURE — 80053 COMPREHEN METABOLIC PANEL: CPT

## 2022-07-09 PROCEDURE — 83735 ASSAY OF MAGNESIUM: CPT

## 2022-07-09 PROCEDURE — 84255 ASSAY OF SELENIUM: CPT

## 2022-07-09 PROCEDURE — 84425 ASSAY OF VITAMIN B-1: CPT

## 2022-07-09 PROCEDURE — 82607 VITAMIN B-12: CPT

## 2022-07-09 PROCEDURE — 36415 COLL VENOUS BLD VENIPUNCTURE: CPT

## 2022-07-09 PROCEDURE — 82306 VITAMIN D 25 HYDROXY: CPT

## 2022-07-09 PROCEDURE — 82525 ASSAY OF COPPER: CPT

## 2022-07-11 NOTE — TELEPHONE ENCOUNTER
I spoke with the patient and informed her that Dr Velma Srinivasan recommended either Urgent Care of an appt in the office. The patient is choosing to go to the Urgent Care.

## 2022-07-12 LAB
COPPER: 204.4 UG/DL (ref 80–155)
SELENIUM: 143.7 UG/L (ref 23–190)
ZINC: 84 UG/DL (ref 60–120)

## 2022-07-14 ENCOUNTER — TELEPHONE (OUTPATIENT)
Dept: FAMILY MEDICINE CLINIC | Age: 32
End: 2022-07-14

## 2022-07-14 DIAGNOSIS — R78.79 HIGH BLOOD COPPER LEVEL: Primary | ICD-10-CM

## 2022-07-14 LAB — VITAMIN B1 WHOLE BLOOD: 114 NMOL/L (ref 70–180)

## 2022-07-14 NOTE — TELEPHONE ENCOUNTER
Message conveyed to patient and will place referral to Dr Orlando Magallanes who she has seen in the past.

## 2022-07-14 NOTE — TELEPHONE ENCOUNTER
Patient called stating she had labs done at [facility] (ordered by her chiropractor Dharmesh Lucas) dated 7-9-22. Please advise.

## 2022-09-13 ENCOUNTER — OFFICE VISIT (OUTPATIENT)
Dept: GASTROENTEROLOGY | Age: 32
End: 2022-09-13
Payer: MEDICAID

## 2022-09-13 VITALS
DIASTOLIC BLOOD PRESSURE: 76 MMHG | SYSTOLIC BLOOD PRESSURE: 122 MMHG | TEMPERATURE: 98 F | WEIGHT: 140 LBS | BODY MASS INDEX: 25.61 KG/M2

## 2022-09-13 DIAGNOSIS — R14.0 ABDOMINAL BLOATING: Primary | ICD-10-CM

## 2022-09-13 DIAGNOSIS — R68.89 EXCESSIVE COPPER INTAKE: ICD-10-CM

## 2022-09-13 DIAGNOSIS — R10.10 PAIN OF UPPER ABDOMEN: ICD-10-CM

## 2022-09-13 DIAGNOSIS — E06.3 HASHIMOTO'S THYROIDITIS: ICD-10-CM

## 2022-09-13 PROCEDURE — G8427 DOCREV CUR MEDS BY ELIG CLIN: HCPCS | Performed by: INTERNAL MEDICINE

## 2022-09-13 PROCEDURE — G8419 CALC BMI OUT NRM PARAM NOF/U: HCPCS | Performed by: INTERNAL MEDICINE

## 2022-09-13 PROCEDURE — 99204 OFFICE O/P NEW MOD 45 MIN: CPT | Performed by: INTERNAL MEDICINE

## 2022-09-13 PROCEDURE — 1036F TOBACCO NON-USER: CPT | Performed by: INTERNAL MEDICINE

## 2022-09-13 ASSESSMENT — ENCOUNTER SYMPTOMS
NAUSEA: 0
DIARRHEA: 0
CHOKING: 0
COUGH: 0
ABDOMINAL PAIN: 1
SORE THROAT: 0
RECTAL PAIN: 0
VOMITING: 0
WHEEZING: 0
ABDOMINAL DISTENTION: 0
VOICE CHANGE: 0
ANAL BLEEDING: 0
SHORTNESS OF BREATH: 0
TROUBLE SWALLOWING: 0
BLOOD IN STOOL: 0
CONSTIPATION: 0

## 2022-09-13 NOTE — PROGRESS NOTES
GI CLINIC FOLLOW UP    NTERVAL HISTORY:   Lila Lin, APRN - CNP  [address] [id number] [facility],  [address] [place] [address]    Chief Complaint   Patient presents with    Irritable Bowel Syndrome     Pt is here today as a new return for a f/u on labs, pt last seen on 10/03/18 for internal hemorrhoids & IBS. 1. Abdominal bloating    2. Hashimoto's thyroiditis    3. Pain of upper abdomen    4. Excessive copper intake        This patient seen my office after more than 4 years  She has been previously seen and followed by me for irritable bowel syndrome-like symptoms  She had GI work-up done in the past  It appears that patient had recent some blood work-up done which has revealed some elevated copper levels  She has no known liver issues  She has some autoimmune issues including Hashimoto's thyroiditis has been followed by endocrinology  Patient has been complaining of some abdominal pains, off and on cramping  Also complains of abdominal bloating and gas  Has off and on nausea without any sig vomiting  Has some alternating constipation and diarrhea  Has no weight loss  Has some anxiety issues  She denies any rectal bleeding any melanotic stools  Previous work-up was reviewed with her  HISTORY OF PRESENT ILLNESS: Malinda Bhatti is a 27 y.o. female with a past history remarkable for , referred for evaluation of   Chief Complaint   Patient presents with    Irritable Bowel Syndrome     Pt is here today as a new return for a f/u on labs, pt last seen on 10/03/18 for internal hemorrhoids & IBS. Lilia Martienz Past Medical,Family, and Social History reviewed and does contribute to the patient presenting condition. Patient's PMH/PSH,SH,PSYCH Hx, MEDs, ALLERGIES, and ROS were all reviewed and updated in the appropriate sections.     PAST MEDICAL HISTORY:  Past Medical History:   Diagnosis Date    Anemia during pregnancy     Irritable bowel syndrome     Kidney stone 9/29/2014    left side 0.6cm sized stone Neurocardiogenic syncope        Past Surgical History:   Procedure Laterality Date    COLONOSCOPY      COLONOSCOPY  08/29/2018    10 yr recall    GYNECOLOGIC CRYOSURGERY      [place] COLON [place] SCRN NOT  [address] IND N/A 8/29/2018    COLONOSCOPY performed by Sal Prado MD at [place] [address]  10/27/2014    int. hemorrhoids: distal protitis?; possible tubular adenoma    UPPER GASTROINTESTINAL ENDOSCOPY  01/16/2012    wnl    UPPER GASTROINTESTINAL ENDOSCOPY  08/29/2018    UPPER GASTROINTESTINAL ENDOSCOPY  8/29/2018    EGD BIOPSY performed by Sal Prado MD at [address] [place] [address]:    Current Outpatient Medications:     Artificial Tear Solution (SOOTHE HYDRATION) 1.25 % SOLN, As needed, Disp: , Rfl:     loratadine (CLARITIN) 10 MG tablet, , Disp: , Rfl:     Sulfacetamide Sodium, Acne, 10 % LOTN, Apply to face daily, Disp: 118 mL, Rfl: 11    tretinoin (RETIN-A) 0.025 % cream, Apply pea sized amount to face nightly, Disp: 45 g, Rfl: 11    Desogestrel-Ethinyl Estradiol (VOLNEA PO), Take by mouth daily, Disp: , Rfl:     ALLERGIES:   Allergies   Allergen Reactions    Benzoyl Peroxide     Benzyl Alcohol Swelling     Acne medication.     [employer] Nut Oil]        FAMILY HISTORY:       Problem Relation Age of Onset    Diabetes Father     High Blood Pressure Father          SOCIAL HISTORY:   Social History     Socioeconomic History    Marital status:      Spouse name: Not on file    Number of children: Not on file    Years of education: Not on file    Highest education level: Not on file   Occupational History    Not on file   Tobacco Use    Smoking status: Never    Smokeless tobacco: Never   Vaping Use    Vaping Use: Never used   Substance and Sexual Activity    Alcohol use: Yes     Comment: rare    Drug use: No    Sexual activity: Not on file   Other Topics Concern    Not on file   Social History Narrative    Not on file     Social Determinants of Health     Financial Resource Strain: Low Risk Difficulty of Paying Living Expenses: Not hard at all   Food Insecurity: No Food Insecurity    Worried About Running Out of Food in the Last Year: Never true    Ran Out of Food in the Last Year: Never true   Transportation Needs: Not on file   Physical Activity: Not on file   Stress: Not on file   Social Connections: Not on file   Intimate Partner Violence: Not on file   Housing Stability: Not on file         REVIEW OF SYSTEMS:         Review of Systems   Constitutional:  Positive for fatigue. Negative for appetite change and unexpected weight change. HENT:  Negative for sore throat, trouble swallowing and voice change. Respiratory:  Negative for cough, choking, shortness of breath and wheezing. Cardiovascular:  Negative for chest pain, palpitations and leg swelling. Gastrointestinal:  Positive for abdominal pain. Negative for abdominal distention, anal bleeding, blood in stool, constipation, diarrhea, nausea, rectal pain and vomiting. Neurological:  Negative for dizziness, weakness, light-headedness, numbness and headaches. Hematological:  Does not bruise/bleed easily. Psychiatric/Behavioral:  Negative for confusion and sleep disturbance. The patient is not nervous/anxious. PHYSICAL EXAMINATION: Vital signs reviewed per the nursing documentation. /76   Temp 98 °F (36.7 °C)   Wt 140 lb (63.5 kg)   BMI 25.61 kg/m²   Body mass index is 25.61 kg/m². Physical Exam  Nursing note reviewed. Constitutional:       Appearance: She is well-developed. Comments: Anxious    HENT:      Head: Normocephalic and atraumatic. Eyes:      Conjunctiva/sclera: Conjunctivae normal.      Pupils: Pupils are equal, round, and reactive to light. Cardiovascular:      Heart sounds: Normal heart sounds. Pulmonary:      Effort: Pulmonary effort is normal.      Breath sounds: Normal breath sounds. Abdominal:      General: Bowel sounds are normal.      Palpations: Abdomen is soft.       Comments: NON TENDER, NON DISTENTED  LIVER SPLEEN AND HERNIAS ARE NOT  PALPABLE  BOWEL SOUNDS ARE POSITIVE      Musculoskeletal:         General: Normal range of motion. Cervical back: Normal range of motion and neck supple. Skin:     General: Skin is warm. Neurological:      Mental Status: She is alert and oriented to person, place, and time. Psychiatric:         Behavior: Behavior normal.         LABORATORY DATA: Reviewed  Lab Results   Component Value Date    WBC 7.7 07/09/2022    HGB 13.5 07/09/2022    HCT 41.1 07/09/2022    MCV 87.6 07/09/2022     07/09/2022     07/09/2022    K 4.3 07/09/2022     07/09/2022    CO2 23 07/09/2022    BUN 9 07/09/2022    CREATININE 0.70 07/09/2022    LABALBU 4.7 07/09/2022    BILITOT 0.49 07/09/2022    ALKPHOS 62 07/09/2022    AST 20 07/09/2022    ALT 12 07/09/2022         Lab Results   Component Value Date    RBC 4.69 07/09/2022    HGB 13.5 07/09/2022    MCV 87.6 07/09/2022    MCH 28.8 07/09/2022    MCHC 32.8 07/09/2022    RDW 12.8 07/09/2022    MPV 8.5 07/09/2022    BASOPCT 0 07/09/2022    LYMPHSABS 2.15 07/09/2022    MONOSABS 0.32 07/09/2022    NEUTROABS 4.85 07/09/2022    EOSABS 0.33 07/09/2022    BASOSABS 0.03 07/09/2022         DIAGNOSTIC TESTING:     No results found. Assessment  1. Abdominal bloating    2. Hashimoto's thyroiditis    3. Pain of upper abdomen    4. Excessive copper intake        Plan    Liver US    Follow-up copper levels    Ultrasound of the gallbladder    Avoid excessive multivitamins etc.  Liver disease work-up as ordered    Pt was advised in detail about some life style and dietary modifications. She was advised about avoidance of caffeine, nicotine and chocolate. Pt was also told to stay away from any kind of fast foods, soda pops. She was also advised to avoid lots of spices, grease and fried food etc.     Instructions were also given about trying to arrange the timing, quality and quantity of food.     Instructions were given about using ample amount of fiber including dietary and supplemental fiber either metamucil, bennafiber or citrucell etc.  Pt was advised about drinking ample amount of water without any colors or chemicals. Stress was given about regular exercise. Pt has verbalized understanding and agreement to these modifications. The patient was instructed to start taking some OTC Probiotics products   These are available over the counter at the Pharmacy stores and Grocery stores  He was explained about the beneficial effects they have in the GI track  They will help to establish the good bacterial mukul and will help with the digestion and bowel movements  The patient has verbalized understanding and agreement to this plan  More than half of patient's clinic visit time was spent in counseling about lifestyle and dietary modifications  Patient's  questions were answered in this regard as well  The patient has verbalized understanding and agreement     We will see her back in few months after above testings      Thank you for allowing me to participate in the care of Ms. Matt Hassan. For any further questions please do not hesitate to contact me. I have reviewed and agree with the ROS entered by the MA/Nurse.          Yassine Parrish MD, [facility]  Board Certified in Gastroenterology and [address] [place] [address] Gastroenterology  Office #: [phone]

## 2022-09-29 ENCOUNTER — HOSPITAL ENCOUNTER (OUTPATIENT)
Age: 32
Discharge: HOME | End: 2022-09-29
Payer: MEDICAID

## 2022-09-29 ENCOUNTER — HOSPITAL ENCOUNTER (OUTPATIENT)
Dept: ULTRASOUND IMAGING | Age: 32
Discharge: HOME | End: 2022-10-01
Payer: MEDICAID

## 2022-09-29 DIAGNOSIS — R10.10 PAIN OF UPPER ABDOMEN: ICD-10-CM

## 2022-09-29 DIAGNOSIS — E06.3 HASHIMOTO'S THYROIDITIS: ICD-10-CM

## 2022-09-29 DIAGNOSIS — R14.0 ABDOMINAL BLOATING: ICD-10-CM

## 2022-09-29 LAB
ALBUMIN SERPL-MCNC: 4.5 G/DL (ref 3.5–5.2)
ALBUMIN/GLOBULIN RATIO: 1.6 (ref 1–2.5)
ALP BLD-CCNC: 57 U/L (ref 35–104)
ALT SERPL-CCNC: 13 U/L (ref 5–33)
AST SERPL-CCNC: 24 U/L
BILIRUB SERPL-MCNC: 0.4 MG/DL (ref 0.3–1.2)
BILIRUBIN DIRECT: <0.1 MG/DL
BILIRUBIN, INDIRECT: NORMAL MG/DL (ref 0–1)
CERULOPLASMIN: 42 MG/DL (ref 16–45)
FERRITIN: 54 NG/ML (ref 13–150)
HAV IGM SER IA-ACNC: NONREACTIVE
HEPATITIS B CORE IGM ANTIBODY: NONREACTIVE
HEPATITIS B SURFACE ANTIGEN: NONREACTIVE
HEPATITIS C ANTIBODY: NONREACTIVE
TOTAL PROTEIN: 7.4 G/DL (ref 6.4–8.3)
TRANSFERRIN: 319 MG/DL (ref 200–360)

## 2022-09-29 PROCEDURE — 83516 IMMUNOASSAY NONANTIBODY: CPT

## 2022-09-29 PROCEDURE — 76705 ECHO EXAM OF ABDOMEN: CPT

## 2022-09-29 PROCEDURE — 80074 ACUTE HEPATITIS PANEL: CPT

## 2022-09-29 PROCEDURE — 82525 ASSAY OF COPPER: CPT

## 2022-09-29 PROCEDURE — 82390 ASSAY OF CERULOPLASMIN: CPT

## 2022-09-29 PROCEDURE — 80076 HEPATIC FUNCTION PANEL: CPT

## 2022-09-29 PROCEDURE — 82728 ASSAY OF FERRITIN: CPT

## 2022-09-29 PROCEDURE — 84466 ASSAY OF TRANSFERRIN: CPT

## 2022-09-29 PROCEDURE — 36415 COLL VENOUS BLD VENIPUNCTURE: CPT

## 2022-10-01 LAB — SMOOTH MUSCLE ANTIBODY: 4 UNITS (ref 0–19)

## 2022-10-02 LAB — COPPER: 221.6 UG/DL (ref 80–155)

## 2022-10-04 ENCOUNTER — TELEPHONE (OUTPATIENT)
Dept: GASTROENTEROLOGY | Age: 32
End: 2022-10-04

## 2022-10-04 DIAGNOSIS — R68.89 EXCESSIVE COPPER INTAKE: Primary | ICD-10-CM

## 2022-10-04 NOTE — TELEPHONE ENCOUNTER
Pt LM yesterday afternoon asking if her high level of copper could be from her birth control. She also wants to know what she should do about her US results and having gallstones.

## 2022-10-08 ENCOUNTER — HOSPITAL ENCOUNTER (OUTPATIENT)
Age: 32
Discharge: HOME | End: 2022-10-08
Payer: MEDICAID

## 2022-10-08 PROCEDURE — 36415 COLL VENOUS BLD VENIPUNCTURE: CPT

## 2022-10-08 PROCEDURE — 82525 ASSAY OF COPPER: CPT

## 2022-10-12 LAB
COPPER, URINE /24 HR: NORMAL UG/D (ref 3–45)
COPPER, URINE /VOLUME: <1 UG/DL
COPPER, URINE RATIO CREATININE: NORMAL UG/G CRT (ref 10–45)
CREATININE URINE /24 HR: NORMAL MG/D (ref 700–1600)
CREATININE URINE /VOLUME: 76 MG/DL
HOURS COLLECTED: NORMAL
URINE VOLUME: NORMAL

## 2022-10-25 ENCOUNTER — OFFICE VISIT (OUTPATIENT)
Dept: DERMATOLOGY | Age: 32
End: 2022-10-25
Payer: MEDICAID

## 2022-10-25 VITALS
TEMPERATURE: 97.3 F | OXYGEN SATURATION: 98 % | WEIGHT: 138 LBS | HEIGHT: 62 IN | BODY MASS INDEX: 25.4 KG/M2 | HEART RATE: 97 BPM | SYSTOLIC BLOOD PRESSURE: 113 MMHG | DIASTOLIC BLOOD PRESSURE: 76 MMHG

## 2022-10-25 DIAGNOSIS — D18.01 CHERRY ANGIOMA: Primary | ICD-10-CM

## 2022-10-25 DIAGNOSIS — B07.9 VERRUCA VULGARIS: ICD-10-CM

## 2022-10-25 PROCEDURE — G8419 CALC BMI OUT NRM PARAM NOF/U: HCPCS | Performed by: DERMATOLOGY

## 2022-10-25 PROCEDURE — 99212 OFFICE O/P EST SF 10 MIN: CPT | Performed by: DERMATOLOGY

## 2022-10-25 PROCEDURE — G8427 DOCREV CUR MEDS BY ELIG CLIN: HCPCS | Performed by: DERMATOLOGY

## 2022-10-25 PROCEDURE — G8484 FLU IMMUNIZE NO ADMIN: HCPCS | Performed by: DERMATOLOGY

## 2022-10-25 PROCEDURE — 1036F TOBACCO NON-USER: CPT | Performed by: DERMATOLOGY

## 2022-10-26 NOTE — PROGRESS NOTES
Dermatology Patient Note  [address]  [facility]  Dept: [phone]  Dept Fax: [phone]      VISITDATE: 10/25/2022   REFERRING PROVIDER: No ref. provider found      Bina Nayak is a 27 y.o. female  who presents today in the office for:    Follow-up (Pt here for red lesions on her abdomen and chest-states she tans in a tanning bed and noticed them afterwards. Bethanie Erwin for a hair loss follow up as well. 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    Acne vulgaris 06/24/2021    Hashimoto's thyroiditis 06/24/2021    Vitamin D deficiency 08/25/2020    Hypokalemia 08/25/2020    Rectal pain 06/13/2018    Abdominal pain 06/13/2018    Abdominal bloating 06/13/2018    Nausea 06/13/2018    Rectal bleeding 05/22/2015    Gall stones 05/22/2015       CURRENT MEDICATIONS:   Current Outpatient Medications   Medication Sig Dispense Refill    Artificial Tear Solution (SOOTHE HYDRATION) 1.25 % SOLN As needed      loratadine (CLARITIN) 10 MG tablet       Sulfacetamide Sodium, Acne, 10 % LOTN Apply to face daily 118 mL 11    tretinoin (RETIN-A) 0.025 % cream Apply pea sized amount to face nightly 45 g 11    Desogestrel-Ethinyl Estradiol (VOLNEA PO) Take by mouth daily (Patient not taking: Reported on 10/25/2022)       No current facility-administered medications for this visit. ALLERGIES:   Allergies   Allergen Reactions    Benzoyl Peroxide     Benzyl Alcohol Swelling     Acne medication.     [address] HISTORY:  Social History     Tobacco Use    Smoking status: Never    Smokeless tobacco: Never   Substance Use Topics    Alcohol use: Yes     Comment: rare       Pertinent ROS:  Review of Systems  Skin: Denies any new changing, growing or bleeding lesions or rashes except as described in the HPI   Constitutional: Denies fevers, chills, and malaise. PHYSICAL EXAM:   /76   Pulse 97   Temp 97.3 °F (36.3 °C)   Ht 5' 2\" (1.575 m)   Wt 138 lb (62.6 kg)   SpO2 98%   BMI 25.24 kg/m²     The patient is generally well appearing, well nourished, alert and conversational. Affect is normal.    Cutaneous Exam:  Physical Exam  Focused exam of abdomen, chest, legs was performed    Facial covering was not removed during examination. Diagnoses/exam findings/medical history pertinent to this visit are listed below:    Assessment:   Diagnosis Orders   1. Cherry angioma        2. Verruca vulgaris             Plan:  1. Cherry angioma  reassurance and education     2. Verruca vulgaris  Cryotherapy: After verbal consent was obtained including discussion of the risks (lesion persistence, lesion recurrence and hypo/hyperpigmentation) and benefits (resolution of the lesion), and alternative therapies, 1 total Wart on the left toe was pared and treated with liquid nitrogen in a spray gun for a single 6 second freeze-thaw cycle for each lesion. The patient tolerated the procedure well and there were no immediate complications. Apply compound W until resolved then one add'l month, call if recurs after that        RTC as scheduled    Future Appointments   Date Time Provider Bailey Juani   12/16/2022  3:00 PM Fiorella García MD [facility]   2/2/2023  1:30 PM Natal[facility] Deluca [facility]   3/29/2023  2:30 PM Nakianatan Mar MD  [facility]         There are no Patient Instructions on file for this visit.     This note was created with the assistance of a speech-recognition program.  Although the intention is to generate a document that actually reflects the content of the visit, no guarantees can be provided that every mistake has been identified and corrected by editing.     Electronically signed by Gissell Mckeon MD on 10/26/22 at 6:03 AM HONORIOT

## 2023-02-03 ENCOUNTER — OFFICE VISIT (OUTPATIENT)
Dept: GASTROENTEROLOGY | Age: 33
End: 2023-02-03

## 2023-02-03 VITALS
DIASTOLIC BLOOD PRESSURE: 74 MMHG | TEMPERATURE: 97.9 F | BODY MASS INDEX: 24.51 KG/M2 | SYSTOLIC BLOOD PRESSURE: 112 MMHG | WEIGHT: 134 LBS

## 2023-02-03 DIAGNOSIS — E06.3 HASHIMOTO'S THYROIDITIS: ICD-10-CM

## 2023-02-03 DIAGNOSIS — R11.0 NAUSEA: ICD-10-CM

## 2023-02-03 DIAGNOSIS — R14.0 ABDOMINAL BLOATING: ICD-10-CM

## 2023-02-03 DIAGNOSIS — R79.0 ABNORMAL BLOOD LEVEL OF COPPER: Primary | ICD-10-CM

## 2023-02-03 DIAGNOSIS — K80.20 GALL STONES: ICD-10-CM

## 2023-02-03 ASSESSMENT — ENCOUNTER SYMPTOMS
VOICE CHANGE: 0
TROUBLE SWALLOWING: 0
ANAL BLEEDING: 0
SORE THROAT: 0
ABDOMINAL PAIN: 0
CONSTIPATION: 0
ABDOMINAL DISTENTION: 0
CHOKING: 0
RECTAL PAIN: 0
VOMITING: 0
SHORTNESS OF BREATH: 0
COUGH: 0
DIARRHEA: 0
NAUSEA: 0
BLOOD IN STOOL: 0
WHEEZING: 0

## 2023-02-03 NOTE — PROGRESS NOTES
GI CLINIC FOLLOW UP    NTERVAL HISTORY:   No referring provider defined for this encounter. Chief Complaint   Patient presents with    Results     Pt is here today for a f/u on labs/US results, pt last seen on 9/13/22 for excessive copper intake & hashimotos thyroiditis. 1. Abnormal blood level of copper    2. Hashimoto's thyroiditis    3. Nausea    4. Abdominal bloating    5. Gall stones      This patient seen in my office as a follow-up  Has history for mostly irritable bowel syndrome like symptoms  Has been diagnosed with Hashimoto's thyroiditis and followed by endocrinologist    Patient had nonspecific elevation of the copper in the blood  She had a urine copper level done which was normal  Also work-up for John's disease were negative    Clinically she seems to be doing okay  She is not sure if she was using copper in excessive amounts for some reason    Elevated serum copper level appears to be nonspecific/indeterminate at this time    Does not seem to have any accumulation of copper and liver either brain? Some abdominal bloating and gas  Has mild GERD  In terms            HISTORY OF PRESENT ILLNESS: Dino Borjas is a 27 y.o. female with a past history remarkable for , referred for evaluation of   Chief Complaint   Patient presents with    Results     Pt is here today for a f/u on labs/US results, pt last seen on 9/13/22 for excessive copper intake & hashimotos thyroiditis. .    Past Medical,Family, and Social History reviewed and does contribute to the patient presenting condition. Patient's PMH/PSH,SH,PSYCH Hx, MEDs, ALLERGIES, and ROS were all reviewed and updated in the appropriate sections.     PAST MEDICAL HISTORY:  Past Medical History:   Diagnosis Date    Anemia during pregnancy     Irritable bowel syndrome     Kidney stone 9/29/2014    left side 0.6cm sized stone    Neurocardiogenic syncope        Past Surgical History:   Procedure Laterality Date    COLONOSCOPY COLONOSCOPY  08/29/2018    10 yr recall    GYNECOLOGIC CRYOSURGERY      [place] COLON CA SCRN NOT  [address] IND N/A 8/29/2018    COLONOSCOPY performed by Adal Robert MD at [address] [place] [address]  10/27/2014    int. hemorrhoids: distal protitis?; possible tubular adenoma    UPPER GASTROINTESTINAL ENDOSCOPY  01/16/2012    wnl    UPPER GASTROINTESTINAL ENDOSCOPY  08/29/2018    UPPER GASTROINTESTINAL ENDOSCOPY  8/29/2018    EGD BIOPSY performed by Adal Robert MD at [facility]:    Current Outpatient Medications:     Artificial Tear Solution (SOOTHE HYDRATION) 1.25 % SOLN, As needed, Disp: , Rfl:     loratadine (CLARITIN) 10 MG tablet, , Disp: , Rfl:     Sulfacetamide Sodium, Acne, 10 % LOTN, Apply to face daily, Disp: 118 mL, Rfl: 11    tretinoin (RETIN-A) 0.025 % cream, Apply pea sized amount to face nightly, Disp: 45 g, Rfl: 11    Desogestrel-Ethinyl Estradiol (VOLNEA PO), Take by mouth daily (Patient not taking: No sig reported), Disp: , Rfl:     ALLERGIES:   Allergies   Allergen Reactions    Benzoyl Peroxide     Benzyl Alcohol Swelling     Acne medication.     [employer] [place] Nut Oil]        FAMILY HISTORY:       Problem Relation Age of Onset    Diabetes Father     High Blood Pressure Father          SOCIAL HISTORY:   Social History     Socioeconomic History    Marital status:      Spouse name: Not on file    Number of children: Not on file    Years of education: Not on file    Highest education level: Not on file   Occupational History    Not on file   Tobacco Use    Smoking status: Never    Smokeless tobacco: Never   Vaping Use    Vaping Use: Never used   Substance and Sexual Activity    Alcohol use: Yes     Comment: rare    Drug use: No    Sexual activity: Not on file   Other Topics Concern    Not on file   Social History Narrative    Not on file     Social Determinants of Health     Financial Resource Strain: Low Risk     Difficulty of Paying Living Expenses: Not hard at all Food Insecurity: No Food Insecurity    Worried About Running Out of Food in the Last Year: Never true    Ran Out of Food in the Last Year: Never true   Transportation Needs: Not on file   Physical Activity: Not on file   Stress: Not on file   Social Connections: Not on file   Intimate Partner Violence: Not on file   Housing Stability: Not on file         REVIEW OF SYSTEMS:         Review of Systems   Constitutional:  Negative for appetite change, fatigue and unexpected weight change. HENT:  Negative for sore throat, trouble swallowing and voice change. Respiratory:  Negative for cough, choking, shortness of breath and wheezing. Cardiovascular:  Negative for chest pain, palpitations and leg swelling. Gastrointestinal:  Negative for abdominal distention, abdominal pain, anal bleeding, blood in stool, constipation, diarrhea, nausea, rectal pain and vomiting. Neurological:  Negative for dizziness, weakness, light-headedness, numbness and headaches. Hematological:  Does not bruise/bleed easily. Psychiatric/Behavioral:  Negative for confusion and sleep disturbance. The patient is not nervous/anxious. PHYSICAL EXAMINATION: Vital signs reviewed per the nursing documentation. /74   Temp 97.9 °F (36.6 °C)   Wt 134 lb (60.8 kg)   BMI 24.51 kg/m²   Body mass index is 24.51 kg/m². Physical Exam  Nursing note reviewed. Constitutional:       Appearance: She is well-developed. Comments: Anxious    HENT:      Head: Normocephalic and atraumatic. Eyes:      Conjunctiva/sclera: Conjunctivae normal.      Pupils: Pupils are equal, round, and reactive to light. Cardiovascular:      Heart sounds: Normal heart sounds. Pulmonary:      Effort: Pulmonary effort is normal.      Breath sounds: Normal breath sounds. Abdominal:      General: Bowel sounds are normal.      Palpations: Abdomen is soft.       Comments: NON TENDER, NON DISTENTED  LIVER SPLEEN AND HERNIAS ARE NOT  PALPABLE  BOWEL SOUNDS ARE POSITIVE      Musculoskeletal:         General: Normal range of motion. Cervical back: Normal range of motion and neck supple. Skin:     General: Skin is warm. Neurological:      Mental Status: She is alert and oriented to person, place, and time. Psychiatric:         Behavior: Behavior normal.         LABORATORY DATA: Reviewed  Lab Results   Component Value Date    WBC 7.7 07/09/2022    HGB 13.5 07/09/2022    HCT 41.1 07/09/2022    MCV 87.6 07/09/2022     07/09/2022     07/09/2022    K 4.3 07/09/2022     07/09/2022    CO2 23 07/09/2022    BUN 9 07/09/2022    CREATININE 0.70 07/09/2022    LABALBU 4.5 09/29/2022    BILITOT 0.4 09/29/2022    ALKPHOS 57 09/29/2022    AST 24 09/29/2022    ALT 13 09/29/2022         Lab Results   Component Value Date    RBC 4.69 07/09/2022    HGB 13.5 07/09/2022    MCV 87.6 07/09/2022    MCH 28.8 07/09/2022    MCHC 32.8 07/09/2022    RDW 12.8 07/09/2022    MPV 8.5 07/09/2022    BASOPCT 0 07/09/2022    LYMPHSABS 2.15 07/09/2022    MONOSABS 0.32 07/09/2022    NEUTROABS 4.85 07/09/2022    EOSABS 0.33 07/09/2022    BASOSABS 0.03 07/09/2022         DIAGNOSTIC TESTING:     No results found. Assessment  1. Abnormal blood level of copper    2. Hashimoto's thyroiditis    3. Nausea    4. Abdominal bloating    5. Gall stones        Plan    We will repeat copper levels    Continue follow-up with endocrinologist for Hashimoto's thyroiditis    See her back in 3 months    She is clinically feeling    Pt was advised in detail about some life style and dietary modifications. She was advised about avoidance of caffeine, nicotine and chocolate. Pt was also told to stay away from any kind of fast foods, soda pops. She was also advised to avoid lots of spices, grease and fried food etc.     Instructions were also given about trying to arrange the timing, quality and quantity of food.     Instructions were given about using ample amount of fiber including dietary and supplemental fiber either metamucil, bennafiber or citrucell etc.  Pt was advised about drinking ample amount of water without any colors or chemicals. Stress was given about regular exercise. Pt has verbalized understanding and agreement to these modifications. More than half of patient's clinic visit time was spent in counseling about lifestyle and dietary modifications  Patient's  questions were answered in this regard as well  The patient has verbalized understanding and agreement       Thank you for allowing me to participate in the care of Ms. Fátima Sanders. For any further questions please do not hesitate to contact me. I have reviewed and agree with the ROS entered by the MA/Nurse.          Zoila Leventhal, MD, [facility]  Board Certified in Gastroenterology and [address] [place] [address] Gastroenterology  Office #: [phone]

## 2023-02-07 ENCOUNTER — HOSPITAL ENCOUNTER (OUTPATIENT)
Dept: ULTRASOUND IMAGING | Age: 33
Discharge: HOME | End: 2023-02-09
Payer: MEDICAID

## 2023-02-07 DIAGNOSIS — E04.1 LEFT THYROID NODULE: ICD-10-CM

## 2023-02-07 PROCEDURE — 76536 US EXAM OF HEAD AND NECK: CPT

## 2023-05-02 ENCOUNTER — OFFICE VISIT (OUTPATIENT)
Dept: FAMILY MEDICINE CLINIC | Age: 33
End: 2023-05-02
Payer: MEDICAID

## 2023-05-02 VITALS
DIASTOLIC BLOOD PRESSURE: 66 MMHG | SYSTOLIC BLOOD PRESSURE: 90 MMHG | TEMPERATURE: 97.1 F | WEIGHT: 133.2 LBS | HEART RATE: 68 BPM | RESPIRATION RATE: 16 BRPM | BODY MASS INDEX: 24.36 KG/M2

## 2023-05-02 DIAGNOSIS — E55.9 VITAMIN D DEFICIENCY: Primary | ICD-10-CM

## 2023-05-02 DIAGNOSIS — L30.9 DERMATITIS: ICD-10-CM

## 2023-05-02 PROCEDURE — 99213 OFFICE O/P EST LOW 20 MIN: CPT | Performed by: FAMILY MEDICINE

## 2023-05-02 PROCEDURE — 1036F TOBACCO NON-USER: CPT | Performed by: FAMILY MEDICINE

## 2023-05-02 PROCEDURE — G8427 DOCREV CUR MEDS BY ELIG CLIN: HCPCS | Performed by: FAMILY MEDICINE

## 2023-05-02 PROCEDURE — G8420 CALC BMI NORM PARAMETERS: HCPCS | Performed by: FAMILY MEDICINE

## 2023-05-02 RX ORDER — CARBOXYMETHYLCELLULOSE SODIUM 5 MG/ML
SOLUTION/ DROPS OPHTHALMIC
COMMUNITY
Start: 2023-03-29

## 2023-05-02 RX ORDER — CEPHALEXIN 500 MG/1
CAPSULE ORAL
COMMUNITY
Start: 2022-07-19

## 2023-05-02 RX ORDER — IBUPROFEN 800 MG/1
TABLET ORAL
COMMUNITY
Start: 2022-03-04

## 2023-05-02 RX ORDER — TRIAMCINOLONE ACETONIDE 1 MG/ML
LOTION TOPICAL
Qty: 60 ML | Refills: 0 | Status: SHIPPED | OUTPATIENT
Start: 2023-05-02 | End: 2023-05-09

## 2023-05-02 SDOH — ECONOMIC STABILITY: INCOME INSECURITY: HOW HARD IS IT FOR YOU TO PAY FOR THE VERY BASICS LIKE FOOD, HOUSING, MEDICAL CARE, AND HEATING?: NOT HARD AT ALL

## 2023-05-02 SDOH — ECONOMIC STABILITY: FOOD INSECURITY: WITHIN THE PAST 12 MONTHS, THE FOOD YOU BOUGHT JUST DIDN'T LAST AND YOU DIDN'T HAVE MONEY TO GET MORE.: NEVER TRUE

## 2023-05-02 SDOH — ECONOMIC STABILITY: FOOD INSECURITY: WITHIN THE PAST 12 MONTHS, YOU WORRIED THAT YOUR FOOD WOULD RUN OUT BEFORE YOU GOT MONEY TO BUY MORE.: NEVER TRUE

## 2023-05-02 SDOH — ECONOMIC STABILITY: HOUSING INSECURITY
IN THE LAST 12 MONTHS, WAS THERE A TIME WHEN YOU DID NOT HAVE A STEADY PLACE TO SLEEP OR SLEPT IN A SHELTER (INCLUDING NOW)?: NO

## 2023-05-02 ASSESSMENT — PATIENT HEALTH QUESTIONNAIRE - PHQ9
SUM OF ALL RESPONSES TO PHQ QUESTIONS 1-9: 0
SUM OF ALL RESPONSES TO PHQ QUESTIONS 1-9: 0
1. LITTLE INTEREST OR PLEASURE IN DOING THINGS: 0
SUM OF ALL RESPONSES TO PHQ QUESTIONS 1-9: 0
2. FEELING DOWN, DEPRESSED OR HOPELESS: 0
SUM OF ALL RESPONSES TO PHQ QUESTIONS 1-9: 0
SUM OF ALL RESPONSES TO PHQ9 QUESTIONS 1 & 2: 0

## 2023-05-02 ASSESSMENT — ENCOUNTER SYMPTOMS
BLOOD IN STOOL: 0
CHEST TIGHTNESS: 0
SHORTNESS OF BREATH: 0
ABDOMINAL PAIN: 0

## 2023-05-02 NOTE — PROGRESS NOTES
Subjective:      Patient ID: Ted Germain is a 27 y.o. female. Visit Information    Have you changed or started any medications since your last visit including any over-the-counter medicines, vitamins, or herbal medicines? no   Are you having any side effects from any of your medications? -  no  Have you stopped taking any of your medications? Is so, why? -  no    Have you seen any other physician or provider since your last visit? Yes - Records Obtained  Have you had any other diagnostic tests since your last visit? Yes - Records Obtained  Have you been seen in the emergency room and/or had an admission to a hospital since we last saw you? No  Have you had your routine dental cleaning in the past 6 months? yes -     Have you activated your [email or web address] account? If not, what are your barriers? Yes     Patient Care Team:  Laura Archer MD as PCP - General (Family Medicine)  Laura Archer MD as PCP - Empaneled Provider  Roger Delgado MD as Consulting Physician (Gastroenterology)    Medical History Review  Past Medical, Family, and Social History reviewed and does contribute to the patient presenting condition    Health Maintenance   Topic Date Due    COVID-19 Vaccine (1) Never done    HIV screen  Never done    Varicella vaccine (1 of 2 - 2-dose childhood series) Never done    Cervical cancer screen  Never done    Depression Screen  06/14/2023    Flu vaccine (Season Ended) 08/01/2023    DTaP/Tdap/Td vaccine (3 - Td or Tdap) 02/19/2032    Colorectal Cancer Screen  08/21/2033    Hepatitis C screen  Completed    Hepatitis A vaccine  Aged Out    Hib vaccine  Aged Out    Meningococcal (ACWY) vaccine  Aged Out    Pneumococcal 0-64 years Vaccine  Aged Out       HPI  Patient is a 68-year-old female who presents for vitamin D deficiency. She also states that about a week ago after she had gone to a tanning salon, she broke out in a rash on her right leg and right buttock and right hand.   The rash has mostly

## 2023-05-09 ENCOUNTER — HOSPITAL ENCOUNTER (OUTPATIENT)
Age: 33
Discharge: HOME | End: 2023-05-09
Payer: MEDICAID

## 2023-05-09 DIAGNOSIS — R11.0 NAUSEA: ICD-10-CM

## 2023-05-09 DIAGNOSIS — K80.20 GALL STONES: ICD-10-CM

## 2023-05-09 DIAGNOSIS — E55.9 VITAMIN D DEFICIENCY: ICD-10-CM

## 2023-05-09 DIAGNOSIS — R14.0 ABDOMINAL BLOATING: ICD-10-CM

## 2023-05-09 DIAGNOSIS — E06.3 HASHIMOTO'S THYROIDITIS: ICD-10-CM

## 2023-05-09 LAB — 25(OH)D3 SERPL-MCNC: 53.9 NG/ML

## 2023-05-09 PROCEDURE — 82306 VITAMIN D 25 HYDROXY: CPT

## 2023-05-09 PROCEDURE — 82525 ASSAY OF COPPER: CPT

## 2023-05-09 PROCEDURE — 36415 COLL VENOUS BLD VENIPUNCTURE: CPT

## 2023-05-10 LAB — COPPER: 104.5 UG/DL (ref 80–155)

## 2024-03-13 NOTE — ED NOTES
Blood work collected, labeled, and sent to lab     [facility], RN  07/06/20 0089 [facility] Pending    Insurance response  Prescription Drug Insurance: [employer]  Notes: Prior authorization submitted - will update provider when decision has been made by insurance.  Starting Weight: 252lbs
